# Patient Record
Sex: MALE | Race: WHITE
[De-identification: names, ages, dates, MRNs, and addresses within clinical notes are randomized per-mention and may not be internally consistent; named-entity substitution may affect disease eponyms.]

---

## 2020-06-21 ENCOUNTER — HOSPITAL ENCOUNTER (INPATIENT)
Dept: HOSPITAL 11 - JP.ED | Age: 55
LOS: 2 days | Discharge: HOME | DRG: 439 | End: 2020-06-23
Attending: INTERNAL MEDICINE | Admitting: INTERNAL MEDICINE
Payer: MEDICAID

## 2020-06-21 DIAGNOSIS — Z90.49: ICD-10-CM

## 2020-06-21 DIAGNOSIS — K86.1: ICD-10-CM

## 2020-06-21 DIAGNOSIS — F17.210: ICD-10-CM

## 2020-06-21 DIAGNOSIS — K21.9: ICD-10-CM

## 2020-06-21 DIAGNOSIS — K86.3: ICD-10-CM

## 2020-06-21 DIAGNOSIS — Z79.52: ICD-10-CM

## 2020-06-21 DIAGNOSIS — Z79.899: ICD-10-CM

## 2020-06-21 DIAGNOSIS — K52.9: ICD-10-CM

## 2020-06-21 DIAGNOSIS — K50.80: ICD-10-CM

## 2020-06-21 DIAGNOSIS — Z98.890: ICD-10-CM

## 2020-06-21 DIAGNOSIS — E87.1: ICD-10-CM

## 2020-06-21 DIAGNOSIS — K85.90: Primary | ICD-10-CM

## 2020-06-21 NOTE — EDM.PDOC
ED HPI GENERAL MEDICAL PROBLEM





- General


Chief Complaint: Abdominal Pain


Stated Complaint: ABDOMINAL PAIN


Time Seen by Provider: 06/21/20 18:45


Source of Information: Reports: Patient


History Limitations: Reports: No Limitations





- History of Present Illness


INITIAL COMMENTS - FREE TEXT/NARRATIVE: 





56 yo male presents with "pain from his pancreas". Says he was dx with tumors on

his pancreas about 2 mos ago and he is being tx'd by Dr. Heath with prednisone 

for this. He has been using OTC analgesia only so far for the pain but it is now

getting intolerable, describes a gradual worsening. He has an appt with Dr. Heath his primary tomorrow morning at 0730h. He has no appetite, and food makes

him worse. He is tolerating fluids and does not think he is dehydrated. He has 

been losing weight. He did drive himself here today. He has not been referred to

oncology for his tumors yet. He denies nausea. He has chronic diarrhea, not 

worse than usual, from his Crohn's that he also has. He has not had a fever or 

any signs/sx's of infection. 


Onset: Gradual


Duration: Week(s):, Getting Worse


Location: Reports: Abdomen


Quality: Reports: Ache, Burning


Severity: Moderate


Improves with: Reports: None


Worsens with: Reports: Other (time, eating)


Context: Reports: Other (See HPI)


Associated Symptoms: Reports: Loss of Appetite, Other (weight loss).  Denies: D

iaphoresis, Fever/Chills, Nausea/Vomiting, Shortness of Breath


Treatments PTA: Reports: Other (see below) (prednisone)


  ** ABD


Pain Score (Numeric/FACES): 10





- Related Data


                                    Allergies











Allergy/AdvReac Type Severity Reaction Status Date / Time


 


No Known Allergies Allergy   Verified 06/21/20 19:03











Home Meds: 


                                    Home Meds





Omeprazole 1 tab PO DAILY PRN 06/21/20 [History]


predniSONE [Prednisone] 1 tab PO DAILY 06/21/20 [History]











ED ROS GENERAL





- Review of Systems


Review Of Systems: See Below


Constitutional: Reports: Malaise, Decreased Appetite, Weight Loss


HEENT: Reports: No Symptoms


Respiratory: Reports: No Symptoms


Cardiovascular: Reports: No Symptoms


Endocrine: Reports: No Symptoms


GI/Abdominal: Reports: Abdominal Pain


: Reports: No Symptoms


Musculoskeletal: Reports: No Symptoms


Skin: Reports: No Symptoms


Neurological: Reports: No Symptoms


Psychiatric: Reports: No Symptoms





ED EXAM, GI/ABD





- Physical Exam


Exam: See Below


Exam Limited By: No Limitations


General Appearance: Alert, WD/WN, No Apparent Distress, Thin


Eyes: Bilateral: Normal Appearance


Ears: Normal External Exam, Normal Canal, Hearing Grossly Normal, Normal TMs


Nose: Normal Inspection, No Blood


Throat/Mouth: Normal Inspection, Normal Lips, Normal Oropharynx, Normal Voice, 

No Airway Compromise


Head: Atraumatic, Normocephalic


Neck: Normal Inspection


Respiratory/Chest: No Respiratory Distress, Lungs Clear, Normal Breath Sounds, 

No Accessory Muscle Use


Cardiovascular: Regular Rate, Rhythm, No Edema


GI/Abdominal Exam: Normal Bowel Sounds, Soft, Non-Tender, No Distention, Tender 

(epigastrium).  No: Distended, Guarding, Rigid, Rebound, Hernia


Back Exam: Normal Inspection.  No: CVA Tenderness (R), CVA Tenderness (L)


Extremities: Normal Inspection, Normal Range of Motion, Non-Tender, No Pedal 

Edema


Neurological: Alert, Oriented, CN II-XII Intact, Normal Cognition, No 

Motor/Sensory Deficits


Psychiatric: Normal Affect, Normal Mood


Skin Exam: Warm, Dry, Intact, Normal Color, No Rash





Course





- Vital Signs


Text/Narrative:: 





Dr. Santana called @ 2100h, will admit.


Last Recorded V/S: 


                                Last Vital Signs











Temp  36.3 C   06/21/20 19:07


 


Pulse  87   06/21/20 19:07


 


Resp  20   06/21/20 19:07


 


BP  160/97 H  06/21/20 19:07


 


Pulse Ox  99   06/21/20 19:07








                                        





Orthostatic Blood Pressure [     158/93


Supine]                          


Orthostatic Blood Pressure [     142/98


Standing]                        


Orthostatic Blood Pressure [     153/98


Sitting]                         











- Orders/Labs/Meds


Orders: 


                               Active Orders 24 hr











 Category Date Time Status


 


 Orthostatic Vital Signs [RC] ASDIRECTED Care  06/21/20 20:12 Active


 


 HYDROmorphone [Dilaudid] Med  06/21/20 20:56 Once





 0.5 mg IVPUSH ONETIME ONE   


 


 Sodium Chloride 0.9% [Normal Saline] 1,000 ml Med  06/21/20 21:00 Ordered





 IV ASDIRECTED   


 


 fentaNYL [Duragesic] Med  06/21/20 20:15 Active





 25 mcg TRDERM Q72H   








                                Medication Orders





Fentanyl (Duragesic)  25 mcg TRDERM Q72H [facility]


   Last Admin: 06/21/20 20:24  Dose: 25 mcg


   Documented by: VICENTE








Labs: 


                                Laboratory Tests











  06/21/20 06/21/20 06/21/20 Range/Units





  20:29 20:29 20:29 


 


WBC   15.9 H   (4.5-11.0)  K/uL


 


RBC   5.31   (4.30-5.90)  M/uL


 


Hgb   13.7  D   (12.0-15.0)  g/dL


 


Hct   40.7   (40.0-54.0)  %


 


MCV   77 L   (80-98)  fL


 


MCH   26 L   (27-31)  pg


 


MCHC   34   (32-36)  %


 


Plt Count   514 H   (150-400)  K/uL


 


Sodium    128 L  (140-148)  mmol/L


 


Potassium    3.5 L  (3.6-5.2)  mmol/L


 


Chloride    91 L  (100-108)  mmol/L


 


Carbon Dioxide    29  (21-32)  mmol/L


 


Anion Gap    11.5  (5.0-14.0)  mmol/L


 


BUN    5 L D  (7-18)  mg/dL


 


Creatinine    0.7 L  (0.8-1.3)  mg/dL


 


Est Cr Clr Drug Dosing    92.55  mL/min


 


Estimated GFR (MDRD)    > 60  (>60)  


 


Glucose    145 H  ()  mg/dL


 


Calcium    9.3  (8.5-10.1)  mg/dL


 


Lipase  499 H    ()  U/L











Meds: 


Medications











Generic Name Dose Route Start Last Admin





  Trade Name Freq  PRN Reason Stop Dose Admin


 


Fentanyl  25 mcg  06/21/20 20:15  06/21/20 20:24





  Duragesic  TRDERM   25 mcg





  Q72H [facility]   Administration














Discontinued Medications














Generic Name Dose Route Start Last Admin





  Trade Name Freq  PRN Reason Stop Dose Admin


 


Oxycodone/Acetaminophen  1 tab  06/21/20 18:53  06/21/20 19:02





  Percocet 325-7.5 Mg  PO  06/21/20 18:54  1 tab





  ONETIME STA   Administration


 


Oxycodone/Acetaminophen  1 tab  06/21/20 19:33  06/21/20 19:56





  Percocet 325-7.5 Mg  PO  06/21/20 19:34  1 tab





  ONETIME STA   Administration














- Re-Assessments/Exams


Free Text/Narrative Re-Assessment/Exam: 





06/21/20 19:34


No ill effects or benefits from the first dose of percocet 7.5/325 po


Free Text/Narrative Re-Assessment/Exam: 





06/21/20 20:13


moderate relief after 2nd dose of percocet 7.5/325 po. Will add a fentanyl patch

for sustained pain relief. Will check orthostats to support his feeling that he 

is not dehydrated.





Departure





- Departure


Time of Disposition: 21:00


Disposition: Admitted As Inpatient 64


Condition: Fair


Clinical Impression: 


 Acute on chronic pancreatitis








- Discharge Information


*PRESCRIPTION DRUG MONITORING PROGRAM REVIEWED*: No


*COPY OF PRESCRIPTION DRUG MONITORING REPORT IN PATIENT BREEZY: No


Referrals: 


Salina Heath MD [Primary Care Provider] - 


Forms:  ED Department Discharge





Sepsis Event Note (ED)





- Focused Exam


Vital Signs: 


                                   Vital Signs











  Temp Pulse Resp BP Pulse Ox


 


 06/21/20 19:07  36.3 C  87  20  160/97 H  99


 


 06/21/20 18:41  36.3 C  102 H  24 H  160/97 H  99














- My Orders


Last 24 Hours: 


My Active Orders





06/21/20 20:12


Orthostatic Vital Signs [RC] ASDIRECTED 





06/21/20 20:15


fentaNYL [Duragesic]   25 mcg TRDERM Q72H 





06/21/20 20:56


HYDROmorphone [Dilaudid]   0.5 mg IVPUSH ONETIME ONE 





06/21/20 21:00


Sodium Chloride 0.9% [Normal Saline] 1,000 ml IV ASDIRECTED 














- Assessment/Plan


Last 24 Hours: 


My Active Orders





06/21/20 20:12


Orthostatic Vital Signs [RC] ASDIRECTED 





06/21/20 20:15


fentaNYL [Duragesic]   25 mcg TRDERM Q72H 





06/21/20 20:56


HYDROmorphone [Dilaudid]   0.5 mg IVPUSH ONETIME ONE 





06/21/20 21:00


Sodium Chloride 0.9% [Normal Saline] 1,000 ml IV ASDIRECTED

## 2020-06-21 NOTE — PCM.HP.2
H&P History of Present Illness





- General


Date of Service: 06/21/20


Admit Problem/Dx: 


                           Admission Diagnosis/Problem





Admission Diagnosis/Problem      Acute pancreatitis








Source of Information: Patient, Old Records ([facility] chart including GI virtual

visit ), Provider


History Limitations: Reports: No Limitations





- History of Present Illness


Initial Comments - Free Text/Narative: 





CC: I've felt better





HPI: Xavier presents to the emergency room today with several weeks of 

progressive upper abdominal pain.  He reports a couple of months of progressive 

upper abdominal pain but it is been worse in the past few weeks and especially 

the past few days.  He describes waves of sharp pain that come and go throughout

the upper abdomen.  The pain does not radiate elsewhere.  Standing up and moving

around makes the pain worse and lying down makes it better.  Food also causes an

increase in his pain.  He often has to lay down on the couch for several hours 

after he eats.  He has been using over-the-counter pain medications with mild 

improvement.  He says that daily he takes 2 or 3 10 mg prednisone tablets and 

thinks that this helps his pain some 2.  Pain has steadily been getting worse.  

He has seen GI via a virtual visit and he declined referral to the [facility] for pseudocyst treatment because of logistics.  He reports rare nausea

but no vomiting.  No fevers or chills.  He has been losing weight because he is 

not eating very much.  Shortness of breath is at baseline and he does not have a

cough.  He has chronic diarrhea with 3-4 watery bowel movements per day and this

is unchanged.





Work-up in the emergency room revealed mild leukocytosis and hyponatremia as 

well as thrombocytosis.  Review of his laboratory work show these are similar to

his levels from 1 month ago in the clinic.  Lipase was mildly elevated at 499.  

Patient has a fair amount of pain and will be admitted for hydration, pain 

control and potentially additional work-up.


  ** ABD


Pain Score (Numeric/FACES): 10





- Related Data


Allergies/Adverse Reactions: 


                                    Allergies











Allergy/AdvReac Type Severity Reaction Status Date / Time


 


No Known Allergies Allergy   Verified 06/21/20 19:03











Home Medications: 


                                    Home Meds





Omeprazole 1 tab PO DAILY PRN 06/21/20 [History]


predniSONE [Prednisone] 1 tab PO DAILY 06/21/20 [History]











Past Medical History


Gastrointestinal History: Reports: Chronic Diarrhea, GERD, Inflammatory Bowel 

Disease (long standing Crohn's disease), Pancreatitis (with pseudocysts)





- Past Surgical History


GI Surgical History: Reports: Cholecystectomy, Colon, Colonoscopy, Hernia 

Repair/Other, Small Bowel, Other (See Below)


Other GI Surgeries/Procedures: MADHURI





Social & Family History





- Family History


Oncologic: Reports: Other (See Below) (dad had cancer)





- Tobacco Use


Smoking Status *Q: Current Every Day Smoker


Years of Tobacco use: 40


Packs/Tins Daily: 2





- Caffeine Use


Caffeine Use: Reports: Coffee





- Alcohol Use


Alcohol Use History: No





- Recreational Drug Use


Recreational Drug Use: No





H&P Review of Systems





- Review of Systems:


Review Of Systems: See Below


Free Text/Narrative: 





A complete 12 point review of systems was obtained.  Pertinent positives and 

negatives are noted in the history of present illness.  All other systems were 

reviewed and were negative except as noted.





Exam





- Exam


Exam: See Below





- Vital Signs


Vital Signs: 


                                Last Vital Signs











Temp  36.3 C   06/21/20 19:07


 


Pulse  87   06/21/20 19:07


 


Resp  20   06/21/20 19:07


 


BP  160/97 H  06/21/20 19:07


 


Pulse Ox  99   06/21/20 19:07








                                        





Orthostatic Blood Pressure [     158/93


Supine]                          


Orthostatic Blood Pressure [     142/98


Standing]                        


Orthostatic Blood Pressure [     153/98


Sitting]                         








Weight: 56.2 kg





- Exam


Quality Assessment: No: Supplemental Oxygen


General: Alert, Oriented, Cooperative.  No: Mild Distress


HEENT: Conjunctiva Clear.  No: Mucosa Moist & Pink (dry), Scleral Icterus


Neck: Supple, Trachea Midline


Lungs: Clear to Auscultation, Normal Respiratory Effort


Cardiovascular: Regular Rate, Regular Rhythm


GI/Abdominal Exam: Normal Bowel Sounds, Soft, No Distention, Tender (moderate 

diffuse but severe in upper 1/2 of abdomen )


Back Exam: Normal Inspection, Full Range of Motion


Extremities: No Pedal Edema.  No: Increased Warmth


Skin: Warm, Dry


Neuro Extensive - Mental Status: Alert, Oriented x3, Nl Response to Commands


Neuro Extensive - Motor, Sensory, Reflexes: No: Dysarthria, Abnormal Motor, 

Tremor


Psychiatric: Alert, Normal Affect





- Patient Data


Lab Results Last 24 hrs: 


                         Laboratory Results - last 24 hr











  06/21/20 06/21/20 06/21/20 Range/Units





  20:29 20:29 20:29 


 


WBC   15.9 H   (4.5-11.0)  K/uL


 


RBC   5.31   (4.30-5.90)  M/uL


 


Hgb   13.7  D   (12.0-15.0)  g/dL


 


Hct   40.7   (40.0-54.0)  %


 


MCV   77 L   (80-98)  fL


 


MCH   26 L   (27-31)  pg


 


MCHC   34   (32-36)  %


 


Plt Count   514 H   (150-400)  K/uL


 


Sodium    128 L  (140-148)  mmol/L


 


Potassium    3.5 L  (3.6-5.2)  mmol/L


 


Chloride    91 L  (100-108)  mmol/L


 


Carbon Dioxide    29  (21-32)  mmol/L


 


Anion Gap    11.5  (5.0-14.0)  mmol/L


 


BUN    5 L D  (7-18)  mg/dL


 


Creatinine    0.7 L  (0.8-1.3)  mg/dL


 


Est Cr Clr Drug Dosing    92.55  mL/min


 


Estimated GFR (MDRD)    > 60  (>60)  


 


Glucose    145 H  ()  mg/dL


 


Calcium    9.3  (8.5-10.1)  mg/dL


 


Lipase  499 H    ()  U/L











Result Diagrams: 


                                 06/21/20 20:29





                                 06/21/20 20:29





Sepsis Event Note





- Evaluation


Sepsis Screening Result: No Definite Risk





- Focused Exam


Vital Signs: 


                                   Vital Signs











  Temp Pulse Resp BP Pulse Ox


 


 06/21/20 19:07  36.3 C  87  20  160/97 H  99


 


 06/21/20 18:41  36.3 C  102 H  24 H  160/97 H  99











Date Exam was Performed: 06/21/20


Time Exam was Performed: 22:39





*Q Meaningful Use (ADM)





- VTE Risk Assess *Q


Each Risk Factor Represents 1 Point: Age 41 - 59 years, History Inflammatory 

Bowel Disease, Abnormal Pulmonary Function (COPD)


Total Score 1 Point Risk Factors: 3


Each Risk Factor Represents 2 Points: None


Total Score 2 Point Risk Factors: 0


Each Risk Factor Represents 3 Points: None


Total Score 3 Point Risk Factors: 0


Each Risk Factor Represents 5 Points: None


Total Score 5 Point Risk Factors: 0


Venous Thromboembolism Risk Factor Score *Q: 3





- Problem List


(1) Acute on chronic pancreatitis


SNOMED Code(s): 048408183


   ICD Code: K85.90 - ACUTE PANCREATITIS WITHOUT NECROSIS OR INFECTION, UNSP; 

K86.1 - OTHER CHRONIC PANCREATITIS   Status: Acute   Current Visit: Yes   





(2) Pancreatic pseudocyst


SNOMED Code(s): 530903185


   ICD Code: K86.3 - PSEUDOCYST OF PANCREAS   Status: Acute   Current Visit: Yes

  





(3) Crohn's disease


SNOMED Code(s): 38262095


   ICD Code: K50.90 - CROHN'S DISEASE, UNSPECIFIED, WITHOUT COMPLICATIONS   

Status: Acute   Current Visit: Yes   


Qualifiers: 


   Gastrointestinal tract location: small and large intestine   Digestive 

disease complication type: without complication   Qualified Code(s): K50.80 - 

Crohn's disease of both small and large intestine without complications   





(4) Tobacco dependence


SNOMED Code(s): 23216485


   ICD Code: F17.200 - NICOTINE DEPENDENCE, UNSPECIFIED, UNCOMPLICATED   Status:

Chronic   Current Visit: Yes   


Problem List Initiated/Reviewed/Updated: Yes


Orders Last 24hrs: 


                               Active Orders 24 hr











 Category Date Time Status


 


 Patient Status Manage Transfer [TRANSFER] Routine ADT  06/21/20 22:16 Ordered


 


 Orthostatic Vital Signs [RC] ASDIRECTED Care  06/21/20 20:12 Active


 


 Sodium Chloride 0.9% [Normal Saline] 1,000 ml Med  06/21/20 21:00 Active





 IV ASDIRECTED   


 


 fentaNYL [Duragesic] Med  06/21/20 20:15 Active





 25 mcg TRDERM Q72H   


 


 Resuscitation Status Routine Resus Stat  06/21/20 22:17 Ordered








                                Medication Orders





Fentanyl (Duragesic)  25 mcg TRDERM Q[facility]H [facility]


   Last Admin: 06/21/20 20:24  Dose: 25 mcg


   Documented by: VICENTE


Sodium Chloride (Normal Saline)  1,000 mls @ 200 mls/hr IV ASDIRECTED [facility]


   Last Admin: 06/21/20 21:09  Dose: 200 mls/hr


   Documented by: MARCUS








Assessment/Plan Comment:: 





ASSESSMENT AND PLAN - 





Acute on chronic pancreatitis with pseudocysts-White blood cell count is mildly 

elevated but this is stable over the last month.  Patient has had a steady 

progression in his pain that it is now unbearable.  I do not believe there is 

anything acute going on so I am going to hold off on imaging at this time.  I 

suspect he is dealing with the same chronic pancreatitis issues with a slow and 

steady increase in his pain.  Lipase is only mildly elevated.  He has not been 

eating well and does not have adequate means for pain control at home.


-IV fluids


-Solu-Medrol x1 tonight and prednisone 40 mg in the morning


-Toradol and oxycodone for moderate pain


-Dilaudid for severe pain


-Patient may be a good candidate for medical marijuana with his Crohn's disease 

as discussed below





Crohn's disease-longstanding issue with previous colon resection and partial 

small bowel resection.  Bowel habits are at baseline at this time so I do not 

think he has an acute flare of Crohn's disease at this time.  He has been 

managing his Crohn's disease with daily prednisone.


-Increased prednisone dosing as above





Tobacco dependence-smokes 2 packs/day.  No interest in quitting.


-Nicotine patch





Maintenance issues - 


- DVT prophylaxis -mechanical


- GI prophylaxis -PPI


- Nutrition -nothing by mouth


- Reinoso catheter -not indicated





CODE STATUS -full code





Admission justification -this patient will be admitted for inpatient services 

and is medically appropriate meeting medical necessity for inpatient admission 

as outlined in my documentation.  I reasonably expect the patient will require 

inpatient services that span a period time over 2 midnights. I reasonably expect

 this patient to be discharged or transferred within 96 hours after [facility]





Disposition -I would anticipate discharge home after the hospital stay





Primary care physician -Dr. Salina Santana M.D.





- Mortality Measure


Prognosis:: Good

## 2020-06-22 RX ADMIN — Medication SCH: at 09:00

## 2020-06-22 RX ADMIN — Medication SCH: at 21:57

## 2020-06-22 NOTE — PCM.PN
- General Info


Date of Service: 06/22/20


Subjective Update: 





Mr. Asif is a 55-year-old gentleman who was admitted through the emergency 

department last night with significant abdominal pain and nausea secondary to 

chronic pancreatitis and known pseudocysts.  He has been treated with narcotic 

therapy since admission and reports that his pain is under good control and 

would like to try eating.  Lipase level was mildly elevated at 500.


Functional Status: Reports: Ambulating, Urinating





- Review of Systems


General: Reports: No Symptoms


Pulmonary: Reports: No Symptoms


Cardiovascular: Reports: No Symptoms


Gastrointestinal: Reports: No Symptoms





- Patient Data


Vitals - Most Recent: 


                                Last Vital Signs











Temp  96.7 F L  06/22/20 15:00


 


Pulse  89   06/22/20 15:00


 


Resp  17   06/22/20 15:00


 


BP  119/74   06/22/20 15:00


 


Pulse Ox  99   06/22/20 15:00








                                        





Orthostatic Blood Pressure [     158/93


Supine]                          


Orthostatic Blood Pressure [     142/98


Standing]                        


Orthostatic Blood Pressure [     153/98


Sitting]                         








Weight - Most Recent: 121 lb 15.99 oz


I&O - Last 24 Hours: 


                                 Intake & Output











 06/22/20 06/22/20 06/22/20





 06:59 14:59 22:59


 


Intake Total 862 892 


 


Output Total 1200 200 200


 


Balance -338 692 -200











Lab Results Last 24 Hours: 


                         Laboratory Results - last 24 hr











  06/21/20 06/21/20 06/21/20 Range/Units





  20:29 20:29 20:29 


 


WBC   15.9 H   (4.5-11.0)  K/uL


 


RBC   5.31   (4.30-5.90)  M/uL


 


Hgb   13.7  D   (12.0-15.0)  g/dL


 


Hct   40.7   (40.0-54.0)  %


 


MCV   77 L   (80-98)  fL


 


MCH   26 L   (27-31)  pg


 


MCHC   34   (32-36)  %


 


Plt Count   514 H   (150-400)  K/uL


 


Sodium    128 L  (140-148)  mmol/L


 


Potassium    3.5 L  (3.6-5.2)  mmol/L


 


Chloride    91 L  (100-108)  mmol/L


 


Carbon Dioxide    29  (21-32)  mmol/L


 


Anion Gap    11.5  (5.0-14.0)  mmol/L


 


BUN    5 L D  (7-18)  mg/dL


 


Creatinine    0.7 L  (0.8-1.3)  mg/dL


 


Est Cr Clr Drug Dosing    92.55  mL/min


 


Estimated GFR (MDRD)    > 60  (>60)  


 


Glucose    145 H  ()  mg/dL


 


Calcium    9.3  (8.5-10.1)  mg/dL


 


Magnesium     (1.8-2.4)  mg/dL


 


C-Reactive Protein     (0.0-0.3)  mg/dL


 


Lipase  499 H    ()  U/L














  06/22/20 06/22/20 Range/Units





  04:50 04:50 


 


WBC  13.4 H   (4.5-11.0)  K/uL


 


RBC  5.08   (4.30-5.90)  M/uL


 


Hgb  13.1   (12.0-15.0)  g/dL


 


Hct  39.7 L   (40.0-54.0)  %


 


MCV  78 L   (80-98)  fL


 


MCH  26 L   (27-31)  pg


 


MCHC  33   (32-36)  %


 


Plt Count  470 H   (150-400)  K/uL


 


Sodium   133 L  (140-148)  mmol/L


 


Potassium   4.3  (3.6-5.2)  mmol/L


 


Chloride   97 L  (100-108)  mmol/L


 


Carbon Dioxide   30  (21-32)  mmol/L


 


Anion Gap   10.3  (5.0-14.0)  mmol/L


 


BUN   4 L  (7-18)  mg/dL


 


Creatinine   0.7 L  (0.8-1.3)  mg/dL


 


Est Cr Clr Drug Dosing   91.13  mL/min


 


Estimated GFR (MDRD)   > 60  (>60)  


 


Glucose   139 H  ()  mg/dL


 


Calcium   8.8  (8.5-10.1)  mg/dL


 


Magnesium   1.9  (1.8-2.4)  mg/dL


 


C-Reactive Protein   4.78 H  (0.0-0.3)  mg/dL


 


Lipase    ()  U/L











Med Orders - Current: 


                               Current Medications





Acetaminophen (Tylenol)  650 mg PO Q4H PRN


   PRN Reason: Pain (Mild 1-3)/fever


Albuterol (Proventil Neb Soln)  2.5 mg NEB Q4H PRN


   PRN Reason: Shortness Of Breath/wheezing


Fentanyl (Duragesic)  25 mcg TRDERM Q72H [facility]


   Last Admin: 06/21/20 20:24 Dose:  25 mcg


   Documented by: 


Ketorolac Tromethamine (Toradol)  30 mg IVPUSH Q6H PRN


   PRN Reason: Pain (moderate 4-6)


   Stop: 06/26/20 22:50


Lorazepam (Ativan)  0.5 mg IVPUSH Q4H PRN


   PRN Reason: Nausea/Vomiting


Nicotine (Habitrol)  21 mg TRDERM DAILY [facility]


   Last Admin: 06/22/20 08:59 Dose:  21 mg


   Documented by: 


Nicotine (Habitrol)  14 mg TRDERM DAILY [facility]


   Last Admin: 06/22/20 15:40 Dose:  14 mg


   Documented by: 


Nicotine Polacrilex (Nicorelief)  4 mg CHEW Q1H PRN


   PRN Reason: SMOKING CESSATION


Verify Fentanyl (Patch)  0 each TOP BID [facility]


   Last Admin: 06/22/20 09:00 Dose:  Not Given


   Documented by: 


Ondansetron HCl (Zofran)  4 mg IV Q6H PRN


   PRN Reason: Nausea/Vomiting


Ondansetron HCl (Zofran Odt)  4 mg PO Q6H PRN


   PRN Reason: Nausea able to take PO


Oxycodone HCl (Oxycodone)  5 - 10 mg PO Q4H PRN


   PRN Reason: Pain


   Last Admin: 06/22/20 13:17 Dose:  10 mg


   Documented by: 


Pantoprazole Sodium (Protonix***)  40 mg PO ACBREAKFAST [facility]


   Last Admin: 06/22/20 07:31 Dose:  40 mg


   Documented by: 


Prednisone (Prednisone)  40 mg PO WITHBREAKFAST [facility]


   Last Admin: 06/22/20 08:59 Dose:  40 mg


   Documented by: 





Discontinued Medications





Hydromorphone HCl (Dilaudid)  0.5 mg IVPUSH ONETIME ONE


   Stop: 06/21/20 20:57


   Last Admin: 06/21/20 21:10 Dose:  0.5 mg


   Documented by: 


Hydromorphone HCl (Dilaudid)  1 mg IVPUSH Q2H PRN


   PRN Reason: Pain (severe 7-10)


   Last Admin: 06/21/20 23:24 Dose:  1 mg


   Documented by: 


Sodium Chloride (Normal Saline)  1,000 mls @ 200 mls/hr IV ASDIRECTED [facility]


   Last Admin: 06/21/20 21:09 Dose:  200 mls/hr


   Documented by: 


Sodium Chloride (Normal Saline)  1,000 mls @ 125 mls/hr IV ASDIRECTED [facility]


   Last Admin: 06/22/20 12:06 Dose:  125 mls/hr


   Documented by: 


Methylprednisolone Sodium Succinate (Solu-Medrol)  125 mg IVPUSH ONETIME ONE


   Stop: 06/21/20 22:50


   Last Admin: 06/21/20 23:04 Dose:  125 mg


   Documented by: 


Nicotine (Habitrol)  21 mg TRDERM ONETIME ONE


   Stop: 06/21/20 22:50


   Last Admin: 06/21/20 23:04 Dose:  21 mg


   Documented by: 


Oxycodone/Acetaminophen (Percocet 325-7.5 Mg)  1 tab PO ONETIME STA


   Stop: 06/21/20 18:54


   Last Admin: 06/21/20 19:02 Dose:  1 tab


   Documented by: 


Oxycodone/Acetaminophen (Percocet 325-7.5 Mg)  1 tab PO ONETIME STA


   Stop: 06/21/20 19:34


   Last Admin: 06/21/20 19:56 Dose:  1 tab


   Documented by: 











- Exam


Quality Assessment: DVT Prophylaxis


General: Alert, Oriented, Cooperative, No Acute Distress


Lungs: Clear to Auscultation, Normal Respiratory Effort


Cardiovascular: Regular Rate, Regular Rhythm, No Murmurs


GI/Abdominal Exam: Soft, Non-Tender, No Organomegaly, No Distention


Back Exam: Normal Inspection, Full Range of Motion


Extremities: Non-Tender, No Pedal Edema





Sepsis Event Note





- Evaluation


Sepsis Screening Result: No Definite Risk





- Focused Exam


Vital Signs: 


                                   Vital Signs











  Temp Pulse Resp BP Pulse Ox


 


 06/22/20 15:00  96.7 F L  89  17  119/74  99


 


 06/22/20 14:35      98


 


 06/22/20 11:53  96.1 F L  68  14  131/81  98


 


 06/22/20 07:23      97


 


 06/22/20 07:16  96.9 F  62  12  138/74  98


 


 06/22/20 07:11      98











Date Exam was Performed: 06/22/20


Time Exam was Performed: 16:37





- Problem List Review


Problem List Initiated/Reviewed/Updated: Yes





- My Orders


Last 24 Hours: 


My Active Orders





06/22/20 Lunch


Regular Diet [DIET] 





06/22/20 12:54


Convert IV to Saline Lock [OM.PC] Routine 





06/22/20 15:30


Nicotine [Habitrol]   14 mg TRDERM DAILY 





06/22/20 15:31


Nicotine Polacrilex [Nicorelief]   4 mg CHEW Q1H PRN 





06/23/20 05:00


CBC WITH AUTO DIFF [HEME] Timed 


COMPREHENSIVE METABOLIC PN,CMP [CHEM] Timed 


LIPASE [CHEM] Timed 














- Plan


Plan:: 





ASSESSMENT AND PLAN - 





Abdominal pain secondary to chronic pancreatitis with pseudocysts-pain under 

better control on current narcotic therapy


-Saline lock IV


-prednisone 40 mg 


-Toradol and oxycodone for pain


-Discussed with his primary care provider Dr. Heath, she does not feel that 

ongoing narcotic use is a good option for management of his current symptoms as 

an outpatient





Crohn's disease-longstanding issue with previous colon resection and partial 

small bowel resection.  Bowel habits are at baseline at this time so I do not 

think he has an acute flare of Crohn's disease at this time.  He has been 

managing his Crohn's disease with daily prednisone.


-Increased prednisone dosing as above





Tobacco dependence-smokes 2 packs/day.  No interest in quitting.


-Nicotine patch





Maintenance issues - 


- DVT prophylaxis -mechanical


- GI prophylaxis -PPI


- Nutrition -nothing by mouth


- Reinoso catheter -not indicated





CODE STATUS -full code





Admission justification -this patient will be admitted for inpatient services 

and is medically appropriate meeting medical necessity for inpatient admission 

as outlined in my documentation.  I reasonably expect the patient will require 

inpatient services that span a period time over 2 midnights. I reasonably expect

 this patient to be discharged or transferred within 96 hours [facility]





Disposition -I would anticipate discharge home after the hospital stay





Primary care physician -Dr. Salina Heath

## 2020-06-23 NOTE — PCM.DCSUM1
**Discharge Summary





- Hospital Course


Brief History: Mr. Asif is a 55-year-old gentleman who was admitted through the 

emergency department for management of pain secondary to acute on chronic 

pancreatitis with a large pancreatic pseudocysts.





- Discharge Data


Discharge Date: 06/23/20


Discharge Disposition: Home, Self-Care 01


Condition: Fair





- Referral to [facility]


Primary Care Physician: 


Salina Heath MD








- Discharge Diagnosis/Problem(s)


(1) Acute on chronic pancreatitis


SNOMED Code(s): 755442647


   ICD Code: K85.90 - ACUTE PANCREATITIS WITHOUT NECROSIS OR INFECTION, UNSP; 

K86.1 - OTHER CHRONIC PANCREATITIS   Status: Acute   Current Visit: Yes   





(2) Pancreatic pseudocyst


SNOMED Code(s): 268804971


   ICD Code: K86.3 - PSEUDOCYST OF PANCREAS   Status: Chronic   Current Visit: 

Yes   





(3) Crohn's disease


SNOMED Code(s): 82951581


   ICD Code: K50.90 - CROHN'S DISEASE, UNSPECIFIED, WITHOUT COMPLICATIONS   

Status: Chronic   Current Visit: Yes   


Qualifiers: 


   Gastrointestinal tract location: small and large intestine   Digestive 

disease complication type: without complication   Qualified Code(s): K50.80 - 

Crohn's disease of both small and large intestine without complications   





(4) Tobacco dependence


SNOMED Code(s): 06146978


   ICD Code: F17.200 - NICOTINE DEPENDENCE, UNSPECIFIED, UNCOMPLICATED   Status:

Chronic   Current Visit: Yes   





- Patient Summary/Data


Hospital Course: 





Mr. Asif presented to the emergency room with several weeks of progressive upper

abdominal pain.  He reports a couple of months of progressive upper abdominal 

pain but it is been worse in the past few weeks and especially the past few 

days.  He describes waves of sharp pain that come and go throughout the upper 

abdomen.  The pain does not radiate elsewhere.  Standing up and moving around 

makes the pain worse and lying down makes it better.  Food also causes an 

increase in his pain.  He often has to lay down on the couch for several hours 

after he eats.  He has been using over-the-counter pain medications with mild 

improvement.  He says that daily he takes 2 or 3 10 mg prednisone tablets and 

thinks that this helps his pain as well.  Pain has steadily been getting worse. 

He has seen GI via a virtual visit and he declined referral to the [facility] for pseudocyst treatment because of logistics.  He reports rare nausea

but no vomiting.  No fevers or chills.  He has been losing weight because he is 

not eating very much.  Shortness of breath is at baseline and he does not have a

cough.  He has chronic diarrhea with 3-4 watery bowel movements per day and this

is unchanged. Work-up in the emergency room revealed mild leukocytosis and 

hyponatremia as well as thrombocytosis.  Review of his laboratory work show 

these are similar to his levels from 1 month ago in the clinic.  Lipase was 

mildly elevated at 499.  Patient has a fair amount of pain and will be admitted 

for hydration, pain control and potentially additional work-up.  On admission he

was given IV fluids for hydration and kept n.p.o.  He was feeling somewhat 

better by the following morning and was started on a regular diet which he 

tolerated up until the time of discharge.  He is frustrated by the fact that he 

is unable to get his pseudocyst managed locally.  He refuses going to the 

[facility] because of the distance.  Initially he would not accept 

referral to [place], but will now accept this as an alternative to the  of .  

He was started on narcotics on admission and these were discontinued and will 

not be continued after discharge.  He has been taking prednisone for management 

of his symptoms in a higher dose than prescribed.  I explained to him that this 

is dangerous and he should only take the prescribed dose.  He will be discharged

home on 20 mg daily for 4 days and then 10 mg daily thereafter.  Activity will 

be as tolerated and he will resume his usual diet.  Follow-up appointment has 

been scheduled with his primary care provider Dr. Heath within 1 week.  

Appointment with gastroenterology at [facility] in [facility] has 

been requested, they will review his information and then contact him directly 

about an appointment.





- Patient Instructions


Diet: Usual Diet as Tolerated


Activity: As Tolerated





- Discharge Plan


*PRESCRIPTION DRUG MONITORING PROGRAM REVIEWED*: Not Applicable


*COPY OF PRESCRIPTION DRUG MONITORING REPORT IN PATIENT BREEZY: Not Applicable


Prescriptions/Med Rec: 


predniSONE [Prednisone] 1 tab PO DAILY #30


Home Medications: 


                                    Home Meds





Omeprazole 1 tab PO DAILY PRN 06/21/20 [History]


predniSONE [Prednisone] 1 tab PO DAILY #30 06/23/20 [Rx]








Referrals: 


Salina Heath MD [Primary Care Provider] - 06/29/20 1:40 pm


(Please arrive 15 minutes early to register for your appointment.


Your appointment with Dr. Heath will be at the [facility].)





- Discharge Summary/Plan Comment


DC Time >30 min.: No





- Patient Data


Vitals - Most Recent: 


                                Last Vital Signs











Temp  97.3 F   06/23/20 10:36


 


Pulse  92   06/23/20 10:36


 


Resp  16   06/23/20 10:36


 


BP  118/77   06/23/20 10:36


 


Pulse Ox  98   06/23/20 10:36








                                        





Orthostatic Blood Pressure [     158/93


Supine]                          


Orthostatic Blood Pressure [     142/98


Standing]                        


Orthostatic Blood Pressure [     153/98


Sitting]                         








Weight - Most Recent: 121 lb 15.99 oz


I&O - Last 24 hours: 


                                 Intake & Output











 06/22/20 06/23/20 06/23/20





 22:59 06:59 14:59


 


Intake Total 1000  580


 


Output Total 200  


 


Balance 800  580











Lab Results - Last 24 hrs: 


                         Laboratory Results - last 24 hr











  06/23/20 06/23/20 Range/Units





  05:00 05:00 


 


WBC  10.7   (4.5-11.0)  K/uL


 


RBC  4.77   (4.30-5.90)  M/uL


 


Hgb  12.5   (12.0-15.0)  g/dL


 


Hct  37.4 L   (40.0-54.0)  %


 


MCV  78 L   (80-98)  fL


 


MCH  26 L   (27-31)  pg


 


MCHC  33   (32-36)  %


 


Plt Count  486 H   (150-400)  K/uL


 


Neut % (Auto)  75 H   (36-66)  %


 


Lymph % (Auto)  16 L   (24-44)  %


 


Mono % (Auto)  8 H   (2-6)  %


 


Eos % (Auto)  0 L   (2-4)  %


 


Baso % (Auto)  0   (0-1)  %


 


Sodium   132 L  (140-148)  mmol/L


 


Potassium   3.6  (3.6-5.2)  mmol/L


 


Chloride   97 L  (100-108)  mmol/L


 


Carbon Dioxide   30  (21-32)  mmol/L


 


Anion Gap   8.6  (5.0-14.0)  mmol/L


 


BUN   5 L  (7-18)  mg/dL


 


Creatinine   0.7 L  (0.8-1.3)  mg/dL


 


Est Cr Clr Drug Dosing   91.13  mL/min


 


Estimated GFR (MDRD)   > 60  (>60)  


 


Glucose   98  ()  mg/dL


 


Calcium   8.6  (8.5-10.1)  mg/dL


 


Total Bilirubin   0.2  (0.2-1.0)  mg/dL


 


AST   11 L D  (15-37)  U/L


 


ALT   14  (12-78)  U/L


 


Alkaline Phosphatase   90  ()  U/L


 


Total Protein   6.2 L  (6.4-8.2)  g/dL


 


Albumin   2.5 L  (3.4-5.0)  g/dL


 


Globulin   3.7 H  (2.3-3.5)  g/dL


 


Albumin/Globulin Ratio   0.7 L  (1.2-2.2)  


 


Lipase   210  ()  U/L











Med Orders - Current: 


                               Current Medications





Acetaminophen (Tylenol)  650 mg PO Q4H PRN


   PRN Reason: Pain (Mild 1-3)/fever


Albuterol (Proventil Neb Soln)  2.5 mg NEB Q4H PRN


   PRN Reason: Shortness Of Breath/wheezing


Ketorolac Tromethamine (Toradol)  30 mg IVPUSH Q6H PRN


   PRN Reason: Pain (moderate 4-6)


   Stop: 06/26/20 22:50


   Last Admin: 06/23/20 03:13 Dose:  30 mg


   Documented by: 


Lorazepam (Ativan)  0.5 mg IVPUSH Q4H PRN


   PRN Reason: Nausea/Vomiting


Nicotine (Habitrol)  21 mg TRDERM DAILY [facility]


   Last Admin: 06/23/20 11:07 Dose:  21 mg


   Documented by: 


Nicotine (Habitrol)  14 mg TRDERM DAILY [facility]


   Last Admin: 06/23/20 11:06 Dose:  14 mg


   Documented by: 


Nicotine Polacrilex (Nicorelief)  4 mg CHEW Q1H PRN


   PRN Reason: SMOKING CESSATION


Ondansetron HCl (Zofran)  4 mg IV Q6H PRN


   PRN Reason: Nausea/Vomiting


Ondansetron HCl (Zofran Odt)  4 mg PO Q6H PRN


   PRN Reason: Nausea able to take PO


Oxycodone HCl (Oxycodone)  5 mg PO Q4H PRN


   PRN Reason: Pain


   Last Admin: 06/23/20 10:52 Dose:  5 mg


   Documented by: 


Pantoprazole Sodium (Protonix***)  40 mg PO ACBREAKFAST [facility]


   Last Admin: 06/23/20 07:08 Dose:  40 mg


   Documented by: 


Prednisone (Prednisone)  40 mg PO WITHBREAKFAST [facility]


   Last Admin: 06/23/20 07:08 Dose:  40 mg


   Documented by: 





Discontinued Medications





Diphenoxylate HCl/Atropine (Lomotil 0.025-2.5 Mg)  1 tab PO ONETIME ONE


   Stop: 06/23/20 13:01


   Last Admin: 06/23/20 13:07 Dose:  1 tab


   Documented by: 


Fentanyl (Duragesic)  25 mcg TRDERM Q72H [facility]


   Last Admin: 06/21/20 20:24 Dose:  25 mcg


   Documented by: 


Hydromorphone HCl (Dilaudid)  0.5 mg IVPUSH ONETIME ONE


   Stop: 06/21/20 20:57


   Last Admin: 06/21/20 21:10 Dose:  0.5 mg


   Documented by: 


Hydromorphone HCl (Dilaudid)  1 mg IVPUSH Q2H PRN


   PRN Reason: Pain (severe 7-10)


   Last Admin: 06/21/20 23:24 Dose:  1 mg


   Documented by: 


Sodium Chloride (Normal Saline)  1,000 mls @ 200 mls/hr IV ASDIRECTED [facility]


   Last Admin: 06/21/20 21:09 Dose:  200 mls/hr


   Documented by: 


Sodium Chloride (Normal Saline)  1,000 mls @ 125 mls/hr IV ASDIRECTED [facility]


   Last Admin: 06/22/20 12:06 Dose:  125 mls/hr


   Documented by: 


Methylprednisolone Sodium Succinate (Solu-Medrol)  125 mg IVPUSH ONETIME ONE


   Stop: 06/21/20 22:50


   Last Admin: 06/21/20 23:04 Dose:  125 mg


   Documented by: 


Nicotine (Habitrol)  21 mg TRDERM ONETIME ONE


   Stop: 06/21/20 22:50


   Last Admin: 06/21/20 23:04 Dose:  21 mg


   Documented by: 


Verify Fentanyl (Patch)  0 each TOP BID [facility]


   Last Admin: 06/22/20 21:57 Dose:  Not Given


   Documented by: 


Oxycodone HCl (Oxycodone)  5 - 10 mg PO Q4H PRN


   PRN Reason: Pain


   Last Admin: 06/22/20 13:17 Dose:  10 mg


   Documented by: 


Oxycodone/Acetaminophen (Percocet 325-7.5 Mg)  1 tab PO ONETIME STA


   Stop: 06/21/20 18:54


   Last Admin: 06/21/20 19:02 Dose:  1 tab


   Documented by: 


Oxycodone/Acetaminophen (Percocet 325-7.5 Mg)  1 tab PO ONETIME STA


   Stop: 06/21/20 19:34


   Last Admin: 06/21/20 19:56 Dose:  1 tab


   Documented by: 











- Exam


Quality Assessment: Reports: DVT Prophylaxis


General: Reports: Alert, Oriented, Cooperative, Mild Distress


Lungs: Reports: Clear to Auscultation, Normal Respiratory Effort


Cardiovascular: Reports: Regular Rate, Regular Rhythm, No Murmurs


GI/Abdominal Exam: Soft, Non-Tender, No Organomegaly, No Distention


Extremities: Non-Tender, No Pedal Edema

## 2020-09-15 ENCOUNTER — HOSPITAL ENCOUNTER (EMERGENCY)
Dept: HOSPITAL 11 - JP.ED | Age: 55
Discharge: NURSING, REHAB OR LONG-TERM CARE | End: 2020-09-15
Payer: MEDICAID

## 2020-09-15 DIAGNOSIS — F17.210: ICD-10-CM

## 2020-09-15 DIAGNOSIS — Z79.899: ICD-10-CM

## 2020-09-15 DIAGNOSIS — K85.90: Primary | ICD-10-CM

## 2020-09-15 DIAGNOSIS — Z90.49: ICD-10-CM

## 2020-09-15 DIAGNOSIS — K50.90: ICD-10-CM

## 2020-09-15 DIAGNOSIS — K21.9: ICD-10-CM

## 2020-09-15 PROCEDURE — 85025 COMPLETE CBC W/AUTO DIFF WBC: CPT

## 2020-09-15 PROCEDURE — 96375 TX/PRO/DX INJ NEW DRUG ADDON: CPT

## 2020-09-15 PROCEDURE — 36415 COLL VENOUS BLD VENIPUNCTURE: CPT

## 2020-09-15 PROCEDURE — 74177 CT ABD & PELVIS W/CONTRAST: CPT

## 2020-09-15 PROCEDURE — 80053 COMPREHEN METABOLIC PANEL: CPT

## 2020-09-15 PROCEDURE — 96374 THER/PROPH/DIAG INJ IV PUSH: CPT

## 2020-09-15 PROCEDURE — 99285 EMERGENCY DEPT VISIT HI MDM: CPT

## 2020-09-15 PROCEDURE — 96361 HYDRATE IV INFUSION ADD-ON: CPT

## 2020-09-15 PROCEDURE — 83690 ASSAY OF LIPASE: CPT

## 2020-09-15 NOTE — CT
Abdomen Pelvis w Cont

 

CLINICAL HISTORY: History of pancreatitis, abdominal pain

 

COMPARISON: May 2020. 

 

TECHNIQUE: Transverse scans were obtained from the base of the lungs to the

pubic symphysis following oral contrast and IV infusion of contrast.Auto dosage

reduction and iterative reconstructiontechniques employed.

 

FINDINGS: The lung bases show some minimal scarring in the right middle lobe.

The liver shows diffuse patchy fatty infiltration. There there are 2 small left

lobe liver cysts. There is mild prominence of the hepatic bile ducts. Previously

seen large cysts involving the head of the pancreas have involuted. There is

persistent low attenuation in pancreatic head enlargement the including a large

number of coarse calcifications which are seen throughout the pancreas. There is

pancreatic ductal dilatation throughout the body. The low attenuation and fluid

attenuation foci extending down to the mesentery on prior CT has involuted

significantly. There is currently 1.5 x 2.2 cm fluid density collection in the

mid mesentery. The gallbladder has been removed. The spleen has a normal size

and shape.

 The adrenal glands are normal bilaterally . The kidneys show no mass, stones or

hydronephrosis. The ureters have normal course and contour. The bladder has a

normal contour. Small intestinal configuration is nonacute there are surgical

sutures and clips in the right the colon there is some generalized thickening of

the sigmoid with some suggestion of the mucosal enhancement.. This was seen on

the prior study there are lesser degree.

There is no suspicious retroperitoneal adenopathy. 

 

 

IMPRESSION: Diffuse changes of chronic pancreatitis

 

2 large fluid collections in the pancreatic head have involuted since prior

study

 

Mesenteric inflammation and fluid collections have involuted significantly since

prior study

 

Changes of pancreatitis diminished since prior study

 

Diffuse fatty infiltration of the liver increased since prior study

## 2020-09-15 NOTE — EDM.PDOC
<Siri Mccullough M - Last Filed: 09/15/20 15:07>





ED HPI GENERAL MEDICAL PROBLEM





- General


Chief Complaint: Abdominal Pain


Stated Complaint: MEDICAL VIA NORTH


Time Seen by Provider: 09/15/20 13:40


Source of Information: Reports: Patient, RN, RN Notes Reviewed


History Limitations: Reports: No Limitations





- History of Present Illness


INITIAL COMMENTS - FREE TEXT/NARRATIVE: 





Pt here to ER with c/o pain similar to pancreatitis flare a few months ago. Pt 

pain is 6/10 to abd. Pain does not radiate. Pain is not relieved with OTC. 

Nausea with dry heaves. C/o pain to abd. Denies CP, cough, chills, SOB, or 

fever. Positive for sweats lat night. 


Onset: Today


Onset Date: 09/15/20


Onset Time: 03:00


Duration: Hour(s):, Getting Worse


Location: Reports: Abdomen


Severity: Moderate


Improves with: Reports: None


Associated Symptoms: Reports: Loss of Appetite, Nausea/Vomiting


  ** Upper Abdominal


Pain Score (Numeric/FACES): 7





- Related Data


                                    Allergies











Allergy/AdvReac Type Severity Reaction Status Date / Time


 


No Known Allergies Allergy   Verified 09/15/20 13:15











Home Meds: 


                                    Home Meds





Omeprazole 1 tab PO DAILY PRN 06/21/20 [History]


predniSONE [Prednisone] 1 tab PO DAILY #30 06/23/20 [Rx]











Past Medical History


Gastrointestinal History: Reports: Chronic Diarrhea, GERD, Inflammatory Bowel 

Disease, Pancreatitis


Musculoskeletal History: Reports: Other (See Below)


Other Musculoskeletal History: crush injury r knee  3 to 4 years ago.





- Infectious Disease History


Infectious Disease History: Reports: Chicken Pox





- Past Surgical History


GI Surgical History: Reports: Cholecystectomy, Colon, Colonoscopy, Hernia 

Repair/Other, Small Bowel, Other (See Below)


Other GI Surgeries/Procedures: MADHURI





Social & Family History





- Family History


Family Medical History: Noncontributory


Oncologic: Reports: Other (See Below)





- Tobacco Use


Smoking Status *Q: Current Every Day Smoker


Years of Tobacco use: 40


Packs/Tins Daily: 2


Used Tobacco, but Quit: No


Second Hand Smoke Exposure: No





- Caffeine Use


Caffeine Use: Reports: Coffee





- Alcohol Use


Days Per Week of Alcohol Use: 7


Number of Drinks Per Day: 2


Total Drinks Per Week: 14





- Recreational Drug Use


Recreational Drug Use: Yes


Recreational Drug Type: Reports: Marijuana/Hashish


Recreational Drug Use Frequency: Daily





ED ROS GENERAL





- Review of Systems


Review Of Systems: See Below


Constitutional: Reports: Night Sweats, Decreased Appetite


HEENT: Reports: No Symptoms


Respiratory: Reports: No Symptoms


Cardiovascular: Reports: No Symptoms


GI/Abdominal: Reports: Abdominal Pain, Decreased Appetite


: Reports: No Symptoms


Musculoskeletal: Reports: No Symptoms


Skin: Reports: No Symptoms


Neurological: Reports: No Symptoms


Psychiatric: Reports: No Symptoms


Hematologic/Lymphatic: Reports: No Symptoms


Immunologic: Reports: No Symptoms





ED EXAM, GI/ABD





- Physical Exam


Exam: See Below


Exam Limited By: No Limitations


General Appearance: Alert, WD/WN, Moderate Distress


Head: Normocephalic


Neck: Normal Inspection


Respiratory/Chest: No Respiratory Distress, Lungs Clear, Normal Breath Sounds, 

No Accessory Muscle Use


Cardiovascular: Normal Peripheral Pulses, Regular Rate, Rhythm, No Edema, No 

Gallop, No JVD, No Murmur, No Rub


GI/Abdominal Exam: Normal Bowel Sounds, Guarding, Tender


 (Male) Exam: Deferred


Rectal (Males) Exam: Deferred


Back Exam: Normal Inspection


Extremities: Normal Inspection


Neurological: Alert, Oriented, CN II-XII Intact, Normal Cognition


Psychiatric: Normal Affect


Skin Exam: Warm, Dry





Course





- Vital Signs


Text/Narrative:: 





Exam


Labs ordered


Labs appear WNL


Pain med no relief


Will give larger dose


Waiting for CT 





Departure





- Departure


Disposition: DC/[facility]fer to [facility]


Clinical Impression: 


 Acute on chronic pancreatitis





Crohn's disease


Qualifiers:


 Gastrointestinal tract location: small and large intestine Digestive disease 

complication type: without complication Qualified Code(s): K50.80 - Crohn's 

disease of both small and large intestine without complications








- Discharge Information


Referrals: 


PCP,None [Primary Care Provider] - 


Forms:  ED Department Discharge





Sepsis Event Note (ED)





- Evaluation


Sepsis Screening Result: No Definite Risk





<StellarKapil - Last Filed: 09/15/20 16:39>





ED ROS GENERAL





- Review of Systems


Review Of Systems: See Below (Agree with below)





ED EXAM, GI/ABD





- Physical Exam


Exam: See Below (Agree with below)





Course





- Vital Signs


Last Recorded V/S: 





                                Last Vital Signs











Temp  97.3 F   09/15/20 13:14


 


Pulse  80   09/15/20 15:00


 


Resp  16   09/15/20 13:14


 


BP  132/90   09/15/20 15:00


 


Pulse Ox  99   09/15/20 13:14














- Orders/Labs/Meds


Orders: 





                               Active Orders 24 hr











 Category Date Time Status


 


 Peripheral IV Care [RC] .AS DIRECTED Care  09/15/20 13:45 Active


 


 Iopamidol [Isovue-300 (61%)] Med  09/15/20 14:15 Active





 100 ml IV .AS DIRECTED   


 


 Sodium Chloride 0.9% [Normal Saline] 100 ml Med  09/15/20 14:15 Active





 IV ASDIRECTED   


 


 Sodium Chloride 0.9% [Saline Flush] Med  09/15/20 13:45 Active





 10 ml FLUSH ASDIRECTED PRN   


 


 Peripheral IV Insertion Adult [OM.PC] Routine Oth  09/15/20 13:45 Ordered








                                Medication Orders





Sodium Chloride (Normal Saline)  100 mls @ 3 mls/sec IV ASDIRECTED DAPHNE


   Last Admin: 09/15/20 15:02  Dose: 3 mls/sec


   Documented by: BLANK


Iopamidol (Isovue-300 (61%))  100 ml IV .AS DIRECTED DAPHNE


   Last Admin: 09/15/20 15:02  Dose: 100 ml


   Documented by: BLANK


Sodium Chloride (Saline Flush)  10 ml FLUSH ASDIRECTED PRN


   PRN Reason: Keep Vein Open








Labs: 





                                Laboratory Tests











  09/15/20 09/15/20 Range/Units





  13:46 13:46 


 


WBC  12.0 H   (4.5-11.0)  K/uL


 


RBC  5.97 H   (4.30-5.90)  M/uL


 


Hgb  16.2 H D   (12.0-15.0)  g/dL


 


Hct  48.4   (40.0-54.0)  %


 


MCV  81   (80-98)  fL


 


MCH  27   (27-31)  pg


 


MCHC  34   (32-36)  %


 


Plt Count  283   (150-400)  K/uL


 


Neut % (Auto)  88 H   (36-66)  %


 


Lymph % (Auto)  5 L   (24-44)  %


 


Mono % (Auto)  6   (2-6)  %


 


Eos % (Auto)  0 L   (2-4)  %


 


Baso % (Auto)  0   (0-1)  %


 


Sodium   135 L  (140-148)  mmol/L


 


Potassium   4.1  (3.6-5.2)  mmol/L


 


Chloride   98 L  (100-108)  mmol/L


 


Carbon Dioxide   27  (21-32)  mmol/L


 


Anion Gap   14.1 H  (5.0-14.0)  mmol/L


 


BUN   5 L  (7-18)  mg/dL


 


Creatinine   1.1  D  (0.8-1.3)  mg/dL


 


Est Cr Clr Drug Dosing   74.09  mL/min


 


Estimated GFR (MDRD)   > 60  (>60)  


 


Glucose   121 H  ()  mg/dL


 


Calcium   8.5  (8.5-10.1)  mg/dL


 


Total Bilirubin   0.3  (0.2-1.0)  mg/dL


 


AST   32  D  (15-37)  U/L


 


ALT   14  (12-78)  U/L


 


Alkaline Phosphatase   155 H  ()  U/L


 


Total Protein   6.5  (6.4-8.2)  g/dL


 


Albumin   2.9 L  (3.4-5.0)  g/dL


 


Globulin   3.6 H  (2.3-3.5)  g/dL


 


Albumin/Globulin Ratio   0.8 L  (1.2-2.2)  


 


Lipase   331  ()  U/L











Meds: 





Medications











Generic Name Dose Route Start Last Admin





  Trade Name Frelashell  PRN Reason Stop Dose Admin


 


Sodium Chloride  100 mls @ 3 mls/sec  09/15/20 14:15  09/15/20 15:02





  Normal Saline  IV   3 mls/sec





  ASDIRECTED DAPHNE   Administration


 


Iopamidol  100 ml  09/15/20 14:15  09/15/20 15:02





  Isovue-300 (61%)  IV   100 ml





  .AS DIRECTED DAPHNE   Administration


 


Sodium Chloride  10 ml  09/15/20 13:45 





  Saline Flush  FLUSH  





  ASDIRECTED PRN  





  Keep Vein Open  














Discontinued Medications














Generic Name Dose Route Start Last Admin





  Trade Name Frelashell  PRN Reason Stop Dose Admin


 


Fentanyl  25 mcg  09/15/20 13:47  09/15/20 14:35





  Sublimaze  IVPUSH  09/15/20 13:48  25 mcg





  ONETIME ONE   Administration


 


Fentanyl  50 mcg  09/15/20 15:07 





  Sublimaze  IVPUSH  09/15/20 15:08 





  ONETIME ONE  


 


Hydromorphone HCl  1 mg  09/15/20 16:25  09/15/20 16:32





  Dilaudid  IVPUSH  09/15/20 16:26  1 mg





  ONETIME ONE   Administration


 


Sodium Chloride  1,000 mls @ 999 mls/hr  09/15/20 13:45 





  Normal Saline  IV  09/15/20 14:45 





  .BOLUS ONE  


 


Sodium Chloride  1,000 mls @ 999 mls/hr  09/15/20 13:52  09/15/20 14:33





  Normal Saline  IV  09/15/20 14:52  999 mls/hr





  .BOLUS ONE   Administration


 


Ondansetron HCl  4 mg  09/15/20 13:46  09/15/20 14:34





  Zofran  IVPUSH  09/15/20 13:47  4 mg





  ONETIME ONE   Administration


 


Sodium Chloride  10 ml  09/15/20 14:01  09/15/20 15:02





  Saline Flush  FLUSH  09/15/20 14:02  10 ml





  ONETIME ONE   Administration














Departure





- Departure


Time of Disposition: 16:38


Condition: Fair





Sepsis Event Note (ED)





- Focused Exam


Vital Signs: 





                                   Vital Signs











  Temp Pulse Resp BP Pulse Ox


 


 09/15/20 15:00   80   132/90 


 


 09/15/20 13:14  97.3 F  85  16  143/89 H  99


 


 09/15/20 13:11  97.3 F  85  16  143/89 H  99














- My Orders


Last 24 Hours: 





My Active Orders





09/15/20 14:15


Iopamidol [Isovue-300 (61%)]   100 ml IV .AS DIRECTED 


Sodium Chloride 0.9% [Normal Saline] 100 ml IV ASDIRECTED 














- Assessment/Plan


Last 24 Hours: 





My Active Orders





09/15/20 14:15


Iopamidol [Isovue-300 (61%)]   100 ml IV .AS DIRECTED 


Sodium Chloride 0.9% [Normal Saline] 100 ml IV ASDIRECTED 











Plan: 





Assessment





Acuity = acute





Site and laterality = chronic pancreatitis with acute flareup complicated 

patient with known history of Crohn's disease





Etiology  = unknown





Manifestations = abdominal pain





Location of injury =  Home





Lab values = WBC elevated 12.0 consistent leukocytosis, hemoglobin slightly 

elevated 16.2 albumin low 2.9 consistent with hypoalbuminemia lipase normal at 

331 CT scan does describe a chronic pancreatitis with 2 pseudocyst that had in 

the pancreas that have become more complex over the last for 5 months





Plan


Call discussed case Dr. Baron at [address] [facility] emergency room 

physician can accept the patient in transport will be transported via EMS ground

for further evaluation

















 This note was dictated using dragon voice recognition software please call with

any questions on syntax or grammar.

## 2020-10-27 ENCOUNTER — HOSPITAL ENCOUNTER (EMERGENCY)
Dept: HOSPITAL 11 - JP.ED | Age: 55
Discharge: HOME | End: 2020-10-27
Payer: MEDICAID

## 2020-10-27 DIAGNOSIS — K86.1: ICD-10-CM

## 2020-10-27 DIAGNOSIS — K85.90: Primary | ICD-10-CM

## 2020-10-27 DIAGNOSIS — F17.210: ICD-10-CM

## 2020-10-27 PROCEDURE — 85025 COMPLETE CBC W/AUTO DIFF WBC: CPT

## 2020-10-27 PROCEDURE — 81001 URINALYSIS AUTO W/SCOPE: CPT

## 2020-10-27 PROCEDURE — 99284 EMERGENCY DEPT VISIT MOD MDM: CPT

## 2020-10-27 PROCEDURE — 96375 TX/PRO/DX INJ NEW DRUG ADDON: CPT

## 2020-10-27 PROCEDURE — 93005 ELECTROCARDIOGRAM TRACING: CPT

## 2020-10-27 PROCEDURE — 80053 COMPREHEN METABOLIC PANEL: CPT

## 2020-10-27 PROCEDURE — 84484 ASSAY OF TROPONIN QUANT: CPT

## 2020-10-27 PROCEDURE — 96376 TX/PRO/DX INJ SAME DRUG ADON: CPT

## 2020-10-27 PROCEDURE — 83690 ASSAY OF LIPASE: CPT

## 2020-10-27 PROCEDURE — 96374 THER/PROPH/DIAG INJ IV PUSH: CPT

## 2020-10-27 PROCEDURE — 71275 CT ANGIOGRAPHY CHEST: CPT

## 2020-10-27 PROCEDURE — 36415 COLL VENOUS BLD VENIPUNCTURE: CPT

## 2020-10-27 PROCEDURE — 80307 DRUG TEST PRSMV CHEM ANLYZR: CPT

## 2020-10-27 PROCEDURE — 82150 ASSAY OF AMYLASE: CPT

## 2020-10-27 NOTE — CRLCT
INDICATION:



Elevated D-dimer



TECHNIQUE:



CT chest PE was acquired with 100 cc Isovue 370 intravenous contrast.



COMPARISON:



None.



FINDINGS:



Heart and vasculature: Contrast opacification of the pulmonary arterial 

tree is adequate. No sign of pulmonary embolism. Atherosclerosis. Thoracic 

aorta is normal in caliber. No pericardial effusion. 



Lungs and pleural: No pleural effusion or pneumothorax. Mild biapical 

pleural parenchymal scarring. Mild centrilobular emphysema. Minimal areas 

of discoid atelectasis. 



Lymph nodes/mediastinum: No mediastinal, hilar, or axillary adenopathy. 



Chest wall: No masses. 



Upper abdomen: A subcentimeter hepatic cyst at the dome. Status post 

cholecystectomy. Extensive calcification throughout the pancreas with mild 

fat stranding surrounding the pancreas as well as small free fluid 

extending into the left upper quadrant adjacent to the stomach. 



Bones: Unremarkable for age. 



IMPRESSION:



1. No evidence of pulmonary embolus.



2. Mild centrilobular emphysema.



3. Pancreatic calcifications with mild adjacent fat stranding and some free 

fluid in the left upper quadrant extending towards the diaphragm. 

Appearance suggests acute on chronic pancreatitis.



Please note that all CT scans at this facility use dose modulation, 

iterative reconstruction, and/or weight-based dosing when appropriate to 

reduce radiation dose to as low as reasonably achievable.



Dictated by Mohit Booker MD @ Oct 27 2020  6:19PM



Signed by Dr. Mohit Booker @ Oct 27 2020  6:25PM

## 2020-10-27 NOTE — EDM.PDOC
<Rosalina Hameed - Last Filed: 10/27/20 18:50>





ED HPI GENERAL MEDICAL PROBLEM





- General


Chief Complaint: Abdominal Pain


Stated Complaint: PANCREATITIS


Time Seen by Provider: 10/27/20 16:35


Source of Information: Reports: Patient


History Limitations: Reports: No Limitations





- History of Present Illness


INITIAL COMMENTS - FREE TEXT/NARRATIVE: 





pt arrives with upper abdomanal pain. He has a history  of upper  abdomanal pin.

He has recently been seen at the clinic and  had atypical chest pain at that 

time. He did have a ddimer that was greater than 3000. He astates that is part 

of the reason he is here because he was informed of this. He also feels like his

pancreatitis  has recently flared. He is not particularly sob at this time. He 

has not had pleuritic chest. 


Onset: Gradual


Duration: Day(s):


Location: Reports: Chest, Abdomen


Associated Symptoms: Reports: Chest Pain, Shortness of Breath, Other (pt is 

having upper abdomanal pain. )


  ** Abdominal


Pain Score (Numeric/FACES): 5





- Related Data


                                    Allergies











Allergy/AdvReac Type Severity Reaction Status Date / Time


 


No Known Allergies Allergy   Verified 10/27/20 14:56











Home Meds: 


                                    Home Meds





Omeprazole 1 tab PO DAILY PRN 06/21/20 [History]


predniSONE [Prednisone] 1 tab PO DAILY #30 06/23/20 [Rx]


Folic Acid 1 tab PO DAILY 10/27/20 [History]


Hydrocod/Acetam 1 tab PO Q4HR PRN 10/27/20 [History]


Lipase/Protease/Amylase [Creon Dr 36,000 Units Capsule] 1 each PO BID 10/27/20 

[History]


Magnesium Chloride [Mag-64] 1 cap PO DAILY 10/27/20 [History]


Multivit,Calc,Mins/Iron/Folic [Thera-M] 1 cap PO DAILY 10/27/20 [History]


Potassium Chloride [Klor-Con] 20 meq PO DAILY 10/27/20 [History]


Thiamine HCl [Vitamin B-1] 250 mg PO DAILY 10/27/20 [History]











Past Medical History


HEENT History: Reports: None


Cardiovascular History: Reports: None


Respiratory History: Reports: None


Gastrointestinal History: Reports: Chronic Diarrhea, GERD, Inflammatory Bowel 

Disease, Pancreatitis


Other Gastrointestinal History: chrons


Genitourinary History: Reports: None


Musculoskeletal History: Reports: Other (See Below)


Other Musculoskeletal History: crush injury r knee  3 to 4 years ago.


Psychiatric History: Reports: None


Hematologic History: Reports: None


Immunologic History: Reports: None


Oncologic (Cancer) History: Reports: None





- Infectious Disease History


Infectious Disease History: Reports: Chicken Pox





- Past Surgical History


HEENT Surgical History: Reports: None


GI Surgical History: Reports: Cholecystectomy, Colon, Colonoscopy, Hernia 

Repair/Other, Small Bowel, Other (See Below)


Other GI Surgeries/Procedures: MADHURI





Social & Family History





- Family History


Family Medical History: Noncontributory


Oncologic: Reports: Other (See Below)





- Tobacco Use


Tobacco Use Status *Q: Current Every Day Tobacco User


Years of Tobacco use: 40


Packs/Tins Daily: 2





- Caffeine Use


Caffeine Use: Reports: Coffee





- Recreational Drug Use


Recreational Drug Use: Yes


Recreational Drug Type: Reports: Marijuana/Hashish





ED ROS GENERAL





- Review of Systems


Review Of Systems: See Below


Constitutional: Reports: No Symptoms


HEENT: Reports: No Symptoms


Respiratory: Reports: Shortness of Breath


Cardiovascular: Reports: Chest Pain, Other (pt was seen at the clinic with chest

 pain. )


Endocrine: Reports: No Symptoms


GI/Abdominal: Reports: Other (pt has upper abdomanal pain. )


: Reports: No Symptoms


Musculoskeletal: Reports: No Symptoms


Skin: Reports: No Symptoms


Neurological: Reports: No Symptoms


Psychiatric: Reports: Anxiety





ED EXAM, GI/ABD





- Physical Exam


Exam: See Below


Text/Narrative:: 





pt arrived with pain in the upper abdoman. He was seen at the clinic with chest 

pain which was not pleuritic. 


Exam Limited By: No Limitations


General Appearance: Alert, Anxious, Moderate Distress


Ears: Normal TMs


Nose: Normal Inspection


Throat/Mouth: Normal Inspection


Head: Atraumatic


Neck: Normal Inspection


Respiratory/Chest: No Respiratory Distress


Cardiovascular: Regular Rate, Rhythm


GI/Abdominal Exam: Tender, Other ( tender in the epigastric area. )


 (Male) Exam: Deferred


Rectal (Males) Exam: Deferred





Course





- Re-Assessments/Exams


Free Text/Narrative Re-Assessment/Exam: 





10/27/20 17:18


pt had a ddimer at the clinic greatwr than 3000. He is also having epigastric 

pain.  He thinks his pancreatitis has flared. 


10/27/20 18:51


pt had a cat scan of the chest which was neg for PE. He ha fluid around his 

pancreas but his enzymes are not elevated. He is now nauseated and has some 

increased pain. The pt contues to use etoh on a regular basis. 





Departure





- Departure


Disposition: Home, Self-Care 01


Clinical Impression: 


 Acute on chronic pancreatitis








- Discharge Information


Instructions:  Acute Pancreatitis, Easy-to-Read


Referrals: 


Salina Heath MD [Primary Care Provider] - 


Forms:  ED Department Discharge


Additional Instructions: 


Please use the oxycodone as needed for pain control, continue with your regular 

medications, limit yourself to clear liquids for bowel rest,  please followup 

with your primary care provider in 3-5 days if not better, please call return to

the emergency department with worsening of symptoms.





Sepsis Event Note (ED)





- Evaluation


Sepsis Screening Result: No Definite Risk





<OfficerKapil - Last Filed: 10/27/20 20:50>





Course





- Vital Signs


Last Recorded V/S: 





                                Last Vital Signs











Temp  98.3 F   10/27/20 14:49


 


Pulse  118 H  10/27/20 14:49


 


Resp  16   10/27/20 17:46


 


BP  150/88 H  10/27/20 17:46


 


Pulse Ox  97   10/27/20 17:46














- Orders/Labs/Meds


Orders: 





                               Active Orders 24 hr











 Category Date Time Status


 


 EKG Documentation Completion [RC] ASDIRECTED Care  10/27/20 17:14 Active


 


 Iopamidol [Isovue-370 (76%)] Med  10/27/20 17:45 Active





 100 ml IV .AS DIRECTED   


 


 Sodium Chloride 0.9% [Normal Saline] 1,000 ml Med  10/27/20 16:45 Active





 IV ASDIRECTED   


 


 Sodium Chloride 0.9% [Normal Saline] 1,000 ml Med  10/27/20 18:45 Active





 IV ASDIRECTED   


 


 Sodium Chloride 0.9% [Normal Saline] 100 ml Med  10/27/20 17:45 Active





 IV ASDIRECTED   


 


 Sodium Chloride 0.9% [Saline Flush] Med  10/27/20 17:39 Active





 10 ml FLUSH ASDIRECTED PRN   


 


 EKG 12 Lead [EK] Routine Ther  10/27/20 17:14 Ordered








                                Medication Orders





Sodium Chloride (Normal Saline)  1,000 mls @ 999 mls/hr IV ASDIRECTED DAPHNE


   Last Admin: 10/27/20 16:45  Dose: 999 mls/hr


   Documented by: MIRIAM


Sodium Chloride (Normal Saline)  100 mls @ 3 mls/sec IV ASDIRECTED DAPHNE


   Last Admin: 10/27/20 17:55  Dose: 3 mls/sec


   Documented by: NIKOLAY


Sodium Chloride (Normal Saline)  1,000 mls @ 400 mls/hr IV ASDIRECTED DAPHNE


   Last Admin: 10/27/20 18:49  Dose: 400 mls/hr


   Documented by: PREILOR


Iopamidol (Isovue-370 (76%))  100 ml IV .AS DIRECTED DAPHNE


   Last Admin: 10/27/20 17:55  Dose: 100 ml


   Documented by: NIKOLAY


Sodium Chloride (Saline Flush)  10 ml FLUSH ASDIRECTED PRN


   PRN Reason: Keep Vein Open


   Last Admin: 10/27/20 17:50  Dose: 10 ml


   Documented by: NIKOLAY








Labs: 





                                Laboratory Tests











  10/27/20 10/27/20 10/27/20 Range/Units





  16:33 16:33 16:39 


 


WBC    16.5 H  (4.5-11.0)  K/uL


 


RBC    5.61  (4.30-5.90)  M/uL


 


Hgb    15.7 H  (12.0-15.0)  g/dL


 


Hct    46.9  (40.0-54.0)  %


 


MCV    84  (80-98)  fL


 


MCH    28  (27-31)  pg


 


MCHC    34  (32-36)  %


 


Plt Count    384  (150-400)  K/uL


 


Neut % (Auto)    87 H  (36-66)  %


 


Lymph % (Auto)    4 L  (24-44)  %


 


Mono % (Auto)    8 H  (2-6)  %


 


Eos % (Auto)    1 L  (2-4)  %


 


Baso % (Auto)    0  (0-1)  %


 


Sodium     (140-148)  mmol/L


 


Potassium     (3.6-5.2)  mmol/L


 


Chloride     (100-108)  mmol/L


 


Carbon Dioxide     (21-32)  mmol/L


 


Anion Gap     (5.0-14.0)  mmol/L


 


BUN     (7-18)  mg/dL


 


Creatinine     (0.8-1.3)  mg/dL


 


Est Cr Clr Drug Dosing     mL/min


 


Estimated GFR (MDRD)     (>60)  


 


Glucose     ()  mg/dL


 


Calcium     (8.5-10.1)  mg/dL


 


Total Bilirubin     (0.2-1.0)  mg/dL


 


AST     (15-37)  U/L


 


ALT     (12-78)  U/L


 


Alkaline Phosphatase     ()  U/L


 


Troponin I  < 0.017    (0.000-0.056)  ng/mL


 


Total Protein     (6.4-8.2)  g/dL


 


Albumin     (3.4-5.0)  g/dL


 


Globulin     (2.3-3.5)  g/dL


 


Albumin/Globulin Ratio     (1.2-2.2)  


 


Amylase     ()  U/L


 


Lipase     ()  U/L


 


Urine Color     (YELLOW)  


 


Urine Appearance     (CLEAR)  


 


Urine pH     (5.0-8.0)  


 


Ur Specific Gravity     (1.008-1.030)  


 


Urine Protein     (NEGATIVE)  mg/dL


 


Urine Glucose (UA)     (NEGATIVE)  mg/dL


 


Urine Ketones     (NEGATIVE)  mg/dL


 


Urine Occult Blood     (NEGATIVE)  


 


Urine Nitrite     (NEGATIVE)  


 


Urine Bilirubin     (NEGATIVE)  


 


Urine Urobilinogen     (0.2-1.0)  EU/dL


 


Ur Leukocyte Esterase     (NEGATIVE)  


 


Urine RBC     (0-5)  


 


Urine WBC     (0-5)  


 


Ur Epithelial Cells     


 


Amorphous Sediment     


 


Urine Bacteria     


 


Urine Mucus     


 


Ethyl Alcohol   < 3   mg/dL














  10/27/20 10/27/20 Range/Units





  16:39 18:37 


 


WBC    (4.5-11.0)  K/uL


 


RBC    (4.30-5.90)  M/uL


 


Hgb    (12.0-15.0)  g/dL


 


Hct    (40.0-54.0)  %


 


MCV    (80-98)  fL


 


MCH    (27-31)  pg


 


MCHC    (32-36)  %


 


Plt Count    (150-400)  K/uL


 


Neut % (Auto)    (36-66)  %


 


Lymph % (Auto)    (24-44)  %


 


Mono % (Auto)    (2-6)  %


 


Eos % (Auto)    (2-4)  %


 


Baso % (Auto)    (0-1)  %


 


Sodium  134 L   (140-148)  mmol/L


 


Potassium  3.8   (3.6-5.2)  mmol/L


 


Chloride  98 L   (100-108)  mmol/L


 


Carbon Dioxide  30   (21-32)  mmol/L


 


Anion Gap  9.8   (5.0-14.0)  mmol/L


 


BUN  7   (7-18)  mg/dL


 


Creatinine  0.9   (0.8-1.3)  mg/dL


 


Est Cr Clr Drug Dosing  76.21   mL/min


 


Estimated GFR (MDRD)  > 60   (>60)  


 


Glucose  141 H   ()  mg/dL


 


Calcium  9.2   (8.5-10.1)  mg/dL


 


Total Bilirubin  0.2   (0.2-1.0)  mg/dL


 


AST  13 L   (15-37)  U/L


 


ALT  17   (12-78)  U/L


 


Alkaline Phosphatase  148 H   ()  U/L


 


Troponin I    (0.000-0.056)  ng/mL


 


Total Protein  6.8   (6.4-8.2)  g/dL


 


Albumin  2.6 L   (3.4-5.0)  g/dL


 


Globulin  4.2 H   (2.3-3.5)  g/dL


 


Albumin/Globulin Ratio  0.6 L   (1.2-2.2)  


 


Amylase  83   ()  U/L


 


Lipase  149   ()  U/L


 


Urine Color   Yellow  (YELLOW)  


 


Urine Appearance   Slightly cloudy A  (CLEAR)  


 


Urine pH   5.5  (5.0-8.0)  


 


Ur Specific Gravity   1.020  (1.008-1.030)  


 


Urine Protein   Negative  (NEGATIVE)  mg/dL


 


Urine Glucose (UA)   Negative  (NEGATIVE)  mg/dL


 


Urine Ketones   Negative  (NEGATIVE)  mg/dL


 


Urine Occult Blood   Negative  (NEGATIVE)  


 


Urine Nitrite   Negative  (NEGATIVE)  


 


Urine Bilirubin   Small H  (NEGATIVE)  


 


Urine Urobilinogen   0.2  (0.2-1.0)  EU/dL


 


Ur Leukocyte Esterase   Negative  (NEGATIVE)  


 


Urine RBC   0-5  (0-5)  


 


Urine WBC   0-5  (0-5)  


 


Ur Epithelial Cells   Rare  


 


Amorphous Sediment   Not seen  


 


Urine Bacteria   Not seen  


 


Urine Mucus   Many  


 


Ethyl Alcohol    mg/dL











Meds: 





Medications











Generic Name Dose Route Start Last Admin





  Trade Name Freq  PRN Reason Stop Dose Admin


 


Sodium Chloride  1,000 mls @ 999 mls/hr  10/27/20 16:45  10/27/20 16:45





  Normal Saline  IV   999 mls/hr





  ASDIRECTED DAPHNE   Administration


 


Sodium Chloride  100 mls @ 3 mls/sec  10/27/20 17:45  10/27/20 17:55





  Normal Saline  IV   3 mls/sec





  ASDIRECTED DAPHNE   Administration


 


Sodium Chloride  1,000 mls @ 400 mls/hr  10/27/20 18:45  10/27/20 18:49





  Normal Saline  IV   400 mls/hr





  ASDIRECTED DAPHNE   Administration


 


Iopamidol  100 ml  10/27/20 17:45  10/27/20 17:55





  Isovue-370 (76%)  IV   100 ml





  .AS DIRECTED DAPHNE   Administration


 


Sodium Chloride  10 ml  10/27/20 17:39  10/27/20 17:50





  Saline Flush  FLUSH   10 ml





  ASDIRECTED PRN   Administration





  Keep Vein Open  














Discontinued Medications














Generic Name Dose Route Start Last Admin





  Trade Name Freq  PRN Reason Stop Dose Admin


 


Al Hydroxide/Mg Hydroxide 15  0 ml  10/27/20 18:38  10/27/20 19:00





  ml/ Lidocaine HCl 15 ml  PO  10/27/20 18:39  30 ml





  ONETIME ONE   Administration


 


Hydromorphone HCl  0.5 mg  10/27/20 16:33  10/27/20 16:45





  Dilaudid  IVPUSH  10/27/20 16:34  0.5 mg





  ONETIME ONE   Administration


 


Hydromorphone HCl  0.5 mg  10/27/20 18:18  10/27/20 18:25





  Dilaudid  IVPUSH  10/27/20 18:19  0.5 mg





  ONETIME ONE   Administration


 


Ondansetron HCl  4 mg  10/27/20 18:50  10/27/20 19:00





  Zofran  IVPUSH  10/27/20 18:51  4 mg





  ONETIME ONE   Administration














Departure





- Departure


Time of Disposition: 20:49


Condition: Poor





Sepsis Event Note (ED)





- Focused Exam


Vital Signs: 





                                   Vital Signs











  Temp Pulse Resp BP Pulse Ox


 


 10/27/20 17:46    16  150/88 H  97


 


 10/27/20 14:49  98.3 F  118 H  16  146/107 H  97














- Assessment/Plan


Plan: 








Took over care from Dr. Hameed at 1900


Assessment





Acuity = acute





Site and laterality = on chronic pancreatitis





Etiology  = probably related to alcohol abuse and dependence





Manifestations = chronic abdominal pain





Location of injury =  Home





Lab values = WBC elevated 16.5 consistent leukocytosis sodium low at 134 

consistent hyponatremia troponin was negative urinalysis negative EtOH negative 

CT scan describes acute on chronic pancreatitis, angiogram negative for any 

pulmonary embolism





Plan


I did offer him admission however we are read alert at this time which were 

required transfer to another facility he declined he would prefer just to go 

home therefore prescription written for oxycodone 5/325 1 tab p.o. 3 times daily

 as needed total #10 he will follow-up with his primary care in the next 3 to 5 

days for reevaluation

















 This note was dictated using dragon voice recognition software please call with

 any questions on syntax or grammar.

## 2021-01-21 ENCOUNTER — HOSPITAL ENCOUNTER (EMERGENCY)
Dept: HOSPITAL 11 - JP.ED | Age: 56
Discharge: NURSING, REHAB OR LONG-TERM CARE | End: 2021-01-21
Payer: MEDICAID

## 2021-01-21 DIAGNOSIS — K66.1: ICD-10-CM

## 2021-01-21 DIAGNOSIS — D73.5: Primary | ICD-10-CM

## 2021-01-21 DIAGNOSIS — Z20.822: ICD-10-CM

## 2021-01-21 DIAGNOSIS — I95.9: ICD-10-CM

## 2021-01-21 DIAGNOSIS — Z72.0: ICD-10-CM

## 2021-01-21 PROCEDURE — 36415 COLL VENOUS BLD VENIPUNCTURE: CPT

## 2021-01-21 PROCEDURE — 82150 ASSAY OF AMYLASE: CPT

## 2021-01-21 PROCEDURE — 87635 SARS-COV-2 COVID-19 AMP PRB: CPT

## 2021-01-21 PROCEDURE — 86850 RBC ANTIBODY SCREEN: CPT

## 2021-01-21 PROCEDURE — 36430 TRANSFUSION BLD/BLD COMPNT: CPT

## 2021-01-21 PROCEDURE — 80307 DRUG TEST PRSMV CHEM ANLYZR: CPT

## 2021-01-21 PROCEDURE — 86901 BLOOD TYPING SEROLOGIC RH(D): CPT

## 2021-01-21 PROCEDURE — 96366 THER/PROPH/DIAG IV INF ADDON: CPT

## 2021-01-21 PROCEDURE — 99285 EMERGENCY DEPT VISIT HI MDM: CPT

## 2021-01-21 PROCEDURE — 80053 COMPREHEN METABOLIC PANEL: CPT

## 2021-01-21 PROCEDURE — 83690 ASSAY OF LIPASE: CPT

## 2021-01-21 PROCEDURE — 86140 C-REACTIVE PROTEIN: CPT

## 2021-01-21 PROCEDURE — 96376 TX/PRO/DX INJ SAME DRUG ADON: CPT

## 2021-01-21 PROCEDURE — 85025 COMPLETE CBC W/AUTO DIFF WBC: CPT

## 2021-01-21 PROCEDURE — 96365 THER/PROPH/DIAG IV INF INIT: CPT

## 2021-01-21 PROCEDURE — 86920 COMPATIBILITY TEST SPIN: CPT

## 2021-01-21 PROCEDURE — 86922 COMPATIBILITY TEST ANTIGLOB: CPT

## 2021-01-21 PROCEDURE — 74177 CT ABD & PELVIS W/CONTRAST: CPT

## 2021-01-21 PROCEDURE — P9016 RBC LEUKOCYTES REDUCED: HCPCS

## 2021-01-21 PROCEDURE — 85610 PROTHROMBIN TIME: CPT

## 2021-01-21 PROCEDURE — 86900 BLOOD TYPING SEROLOGIC ABO: CPT

## 2021-01-21 PROCEDURE — 83605 ASSAY OF LACTIC ACID: CPT

## 2021-01-21 PROCEDURE — U0002 COVID-19 LAB TEST NON-CDC: HCPCS

## 2021-01-21 PROCEDURE — 96375 TX/PRO/DX INJ NEW DRUG ADDON: CPT

## 2021-01-21 PROCEDURE — 99281 EMR DPT VST MAYX REQ PHY/QHP: CPT

## 2021-01-21 NOTE — EDM.PDOC
<OfficerKapil - Last Filed: 01/21/21 19:53>





ED HPI GENERAL MEDICAL PROBLEM





- General


Chief Complaint: Abdominal Pain


Stated Complaint: MEDICAL VIA NORTH


Time Seen by Provider: 01/21/21 16:13





- Related Data


                                    Allergies











Allergy/AdvReac Type Severity Reaction Status Date / Time


 


No Known Allergies Allergy   Verified 01/21/21 15:23











Home Meds: 


                                    Home Meds





Omeprazole 1 tab PO DAILY PRN 06/21/20 [History]


predniSONE [Prednisone] 1 tab PO DAILY #30 06/23/20 [Rx]


Folic Acid 1 tab PO DAILY 10/27/20 [History]


Hydrocod/Acetam 1 tab PO Q4HR PRN 10/27/20 [History]


Lipase/Protease/Amylase [Creon Dr 36,000 Units Capsule] 1 each PO BID 10/27/20 

[History]


Magnesium Chloride [Mag-64] 1 cap PO DAILY 10/27/20 [History]


Multivit,Calc,Mins/Iron/Folic [Thera-M] 1 cap PO DAILY 10/27/20 [History]


Potassium Chloride [Klor-Con] 20 meq PO DAILY 10/27/20 [History]


Thiamine HCl [Vitamin B-1] 250 mg PO DAILY 10/27/20 [History]











Departure





- Departure


Time of Disposition: 19:55


Disposition: DC[facility] to [facility]


Condition: Critical


Clinical Impression: 


Spleen hematoma


Qualifiers:


 Encounter type: initial encounter Qualified Code(s): S36.029A - Unspecified 

contusion of spleen, initial encounter








- Discharge Information


Referrals: 


PCP,None [Primary Care Provider] - 


Forms:  ED Department Discharge





- Assessment/Plan


Plan: 





Assessment





Acuity = acute





Site and laterality = subscapular splenic hematoma with hemoperitoneum





Etiology  = unknown





Manifestations = none





Location of injury =  Home





Lab values = WBC elevated 26.3 consistent with leukocytosis hemoglobin normal 

11.5 platelets elevated 532 consistent with thrombocytosis sodium low at 137 

consistent hyponatremia potassium low 2.9 consistent hypokalemia CRP slightly 

elevated 1.53 lipase is normal at 290 alcohol is negative CT scan describes the 

subcapsular splenic hematoma





Plan


Call discussed case Dr. Zepeda [facility] emergency room physician kindly except

the patient at 1947 he will be transported via EMS ground however if vital signs

will change may need to change that to care if he becomes unstable

















 This note was dictated using dragon voice recognition software please call with

any questions on syntax or grammar.





<Rosalina Hameed - Last Filed: 01/22/21 07:19>





ED HPI GENERAL MEDICAL PROBLEM





- General


Source of Information: Reports: Patient


History Limitations: Reports: No Limitations





- History of Present Illness


INITIAL COMMENTS - FREE TEXT/NARRATIVE: 





pt has a long huistory of pancreatis. He aRRIVED BY AMBULANCE  WITH SEVERE 

ABDOMANAL PAIN WHICH STARTED 2-3 DAYS AGO.  hE HAS BEEN VOMITING. 


Onset: Gradual, Other (MUCH WORSE TODAY. )


Duration: Hour(s):


Location: Reports: Abdomen


Associated Symptoms: Reports: Weakness





Past Medical History


HEENT History: Reports: None


Cardiovascular History: Reports: None


Respiratory History: Reports: None


Gastrointestinal History: Reports: Chronic Diarrhea, GERD, Inflammatory Bowel 

Disease, Pancreatitis


Other Gastrointestinal History: chrons


Genitourinary History: Reports: None


Musculoskeletal History: Reports: Other (See Below)


Other Musculoskeletal History: crush injury r knee  3 to 4 years ago.


Psychiatric History: Reports: None


Hematologic History: Reports: None


Immunologic History: Reports: None


Oncologic (Cancer) History: Reports: None





- Infectious Disease History


Infectious Disease History: Reports: Chicken Pox





- Past Surgical History


HEENT Surgical History: Reports: None


GI Surgical History: Reports: Cholecystectomy, Colon, Colonoscopy, Hernia 

Repair/Other, Small Bowel, Other (See Below)


Other GI Surgeries/Procedures: MADHURI





Social & Family History





- Family History


Family Medical History: No Pertinent Family History


Oncologic: Reports: Other (See Below)





- Tobacco Use


Tobacco Use Status *Q: Current Every Day Tobacco User


Years of Tobacco use: 40


Packs/Tins Daily: 2





- Caffeine Use


Caffeine Use: Reports: Coffee





- Recreational Drug Use


Recreational Drug Use: Yes


Recreational Drug Type: Reports: Marijuana/Hashish


Recreational Drug Use Frequency: Daily





ED ROS GENERAL





- Review of Systems


Review Of Systems: See Below


Constitutional: Reports: No Symptoms


HEENT: Reports: No Symptoms


Respiratory: Reports: Shortness of Breath


Cardiovascular: Reports: No Symptoms


Endocrine: Reports: No Symptoms


GI/Abdominal: Reports: Abdominal Pain, Nausea, Vomiting


: Reports: No Symptoms


Musculoskeletal: Reports: No Symptoms


Skin: Reports: No Symptoms


Neurological: Reports: No Symptoms


Psychiatric: Reports: No Symptoms


Hematologic/Lymphatic: Reports: No Symptoms





ED EXAM, GI/ABD





- Physical Exam


Exam: See Below


Text/Narrative:: 





PT ARRIVED WITH SEVERE UPPER ABDOMANAL PAIN.  hE  STATES THIS STARTED TO GET 

WORSE YESTERDAY.  hE HAS A LONG HISTORY OF PANCREATITIS RECURRENT. 


Exam Limited By: No Limitations


General Appearance: Alert, Anxious, Moderate Distress


Ears: Normal TMs


Nose: Normal Inspection


Throat/Mouth: Normal Inspection


Head: Atraumatic


Neck: Normal Inspection


Respiratory/Chest: No Respiratory Distress


Cardiovascular: Regular Rate, Rhythm, Tachycardia


GI/Abdominal Exam: Guarding, Rigid


 (Male) Exam: Deferred


Rectal (Males) Exam: Deferred


Back Exam: Normal Inspection


Extremities: Normal Inspection


Neurological: Alert, Oriented, Normal Cognition





Course





- Vital Signs


Last Recorded V/S: 


                                Last Vital Signs











Temp  35.8 C L  01/21/21 20:27


 


Pulse  138 H  01/21/21 20:27


 


Resp  27 H  01/21/21 20:27


 


BP  94/55 L  01/21/21 20:27


 


Pulse Ox  100   01/21/21 20:27














- Orders/Labs/Meds


Orders: 


                               Active Orders 24 hr











 Category Date Time Status


 


 FRESH FROZEN PLASMA [BBK] Stat Lab  01/21/21 16:23 Results


 


 PATIENT RETYPE [BBK] Stat Lab  01/21/21 16:23 Results


 


 RED BLOOD CELLS LP [BBK] Stat Lab  01/21/21 16:23 Results


 


 TYPE AND SCREEN [BBK] Stat Lab  01/21/21 16:23 Results


 


 Peripheral IV Insertion Adult [OM.PC] Urgent Oth  01/21/21 19:53 Ordered


 


 Transfuse Red Blood Cells [COMM] Stat Oth  01/21/21 20:25 Ordered











Labs: 


                                Laboratory Tests











  01/21/21 01/21/21 01/21/21 Range/Units





  16:22 16:22 16:22 


 


WBC   26.3 H   (4.5-11.0)  K/uL


 


RBC   4.16 L   (4.30-5.90)  M/uL


 


Hgb   11.5 L D   (12.0-15.0)  g/dL


 


Hct   34.9 L   (40.0-54.0)  %


 


MCV   84   (80-98)  fL


 


MCH   28   (27-31)  pg


 


MCHC   33   (32-36)  %


 


Plt Count   532 H   (150-400)  K/uL


 


Neut % (Auto)   86 H   (36-66)  %


 


Lymph % (Auto)   5 L   (24-44)  %


 


Mono % (Auto)   8 H   (2-6)  %


 


Eos % (Auto)   0 L   (2-4)  %


 


Baso % (Auto)   0   (0-1)  %


 


PT     (9.5-12.0)  sec


 


INR     (0.80-1.20)  


 


Sodium    137 L  (140-148)  mmol/L


 


Potassium    2.9 L*  (3.6-5.2)  mmol/L


 


Chloride    100  (100-108)  mmol/L


 


Carbon Dioxide    27  (21-32)  mmol/L


 


Anion Gap    12.9  (5.0-14.0)  mmol/L


 


BUN    7  (7-18)  mg/dL


 


Creatinine    0.7 L  (0.8-1.3)  mg/dL


 


Est Cr Clr Drug Dosing    97.11  mL/min


 


Estimated GFR (MDRD)    > 60  (>60)  


 


Glucose    160 H  ()  mg/dL


 


Lactic Acid     (0.4-2.0)  mmol/L


 


Calcium    8.4 L  (8.5-10.1)  mg/dL


 


Total Bilirubin    0.2  (0.2-1.0)  mg/dL


 


AST    17  (15-37)  U/L


 


ALT    13  (12-78)  U/L


 


Alkaline Phosphatase    97  ()  U/L


 


C-Reactive Protein     (0.0-0.3)  mg/dL


 


Total Protein    5.4 L  (6.4-8.2)  g/dL


 


Albumin    2.2 L  (3.4-5.0)  g/dL


 


Globulin    3.2  (2.3-3.5)  g/dL


 


Albumin/Globulin Ratio    0.7 L  (1.2-2.2)  


 


Amylase    137 H D  ()  U/L


 


Lipase  290    ()  U/L


 


Ethyl Alcohol     mg/dL


 


SARS CoV-2 RNA Rapid EDI     


 


Blood Type     


 


Gel Antibody Screen     


 


Crossmatch     














  01/21/21 01/21/21 01/21/21 Range/Units





  16:22 16:23 18:24 


 


WBC     (4.5-11.0)  K/uL


 


RBC     (4.30-5.90)  M/uL


 


Hgb     (12.0-15.0)  g/dL


 


Hct     (40.0-54.0)  %


 


MCV     (80-98)  fL


 


MCH     (27-31)  pg


 


MCHC     (32-36)  %


 


Plt Count     (150-400)  K/uL


 


Neut % (Auto)     (36-66)  %


 


Lymph % (Auto)     (24-44)  %


 


Mono % (Auto)     (2-6)  %


 


Eos % (Auto)     (2-4)  %


 


Baso % (Auto)     (0-1)  %


 


PT     (9.5-12.0)  sec


 


INR     (0.80-1.20)  


 


Sodium     (140-148)  mmol/L


 


Potassium     (3.6-5.2)  mmol/L


 


Chloride     (100-108)  mmol/L


 


Carbon Dioxide     (21-32)  mmol/L


 


Anion Gap     (5.0-14.0)  mmol/L


 


BUN     (7-18)  mg/dL


 


Creatinine     (0.8-1.3)  mg/dL


 


Est Cr Clr Drug Dosing     mL/min


 


Estimated GFR (MDRD)     (>60)  


 


Glucose     ()  mg/dL


 


Lactic Acid     (0.4-2.0)  mmol/L


 


Calcium     (8.5-10.1)  mg/dL


 


Total Bilirubin     (0.2-1.0)  mg/dL


 


AST     (15-37)  U/L


 


ALT     (12-78)  U/L


 


Alkaline Phosphatase     ()  U/L


 


C-Reactive Protein  1.53 H    (0.0-0.3)  mg/dL


 


Total Protein     (6.4-8.2)  g/dL


 


Albumin     (3.4-5.0)  g/dL


 


Globulin     (2.3-3.5)  g/dL


 


Albumin/Globulin Ratio     (1.2-2.2)  


 


Amylase     ()  U/L


 


Lipase     ()  U/L


 


Ethyl Alcohol    < 3  mg/dL


 


SARS CoV-2 RNA Rapid EDI     


 


Blood Type   O POSITIVE   


 


Gel Antibody Screen   Negative   


 


Crossmatch   See Detail   














  01/21/21 01/21/21 01/21/21 Range/Units





  19:24 19:46 20:25 


 


WBC     (4.5-11.0)  K/uL


 


RBC     (4.30-5.90)  M/uL


 


Hgb     (12.0-15.0)  g/dL


 


Hct     (40.0-54.0)  %


 


MCV     (80-98)  fL


 


MCH     (27-31)  pg


 


MCHC     (32-36)  %


 


Plt Count     (150-400)  K/uL


 


Neut % (Auto)     (36-66)  %


 


Lymph % (Auto)     (24-44)  %


 


Mono % (Auto)     (2-6)  %


 


Eos % (Auto)     (2-4)  %


 


Baso % (Auto)     (0-1)  %


 


PT  9.5    (9.5-12.0)  sec


 


INR  0.87    (0.80-1.20)  


 


Sodium     (140-148)  mmol/L


 


Potassium     (3.6-5.2)  mmol/L


 


Chloride     (100-108)  mmol/L


 


Carbon Dioxide     (21-32)  mmol/L


 


Anion Gap     (5.0-14.0)  mmol/L


 


BUN     (7-18)  mg/dL


 


Creatinine     (0.8-1.3)  mg/dL


 


Est Cr Clr Drug Dosing     mL/min


 


Estimated GFR (MDRD)     (>60)  


 


Glucose     ()  mg/dL


 


Lactic Acid   3.3 H   (0.4-2.0)  mmol/L


 


Calcium     (8.5-10.1)  mg/dL


 


Total Bilirubin     (0.2-1.0)  mg/dL


 


AST     (15-37)  U/L


 


ALT     (12-78)  U/L


 


Alkaline Phosphatase     ()  U/L


 


C-Reactive Protein     (0.0-0.3)  mg/dL


 


Total Protein     (6.4-8.2)  g/dL


 


Albumin     (3.4-5.0)  g/dL


 


Globulin     (2.3-3.5)  g/dL


 


Albumin/Globulin Ratio     (1.2-2.2)  


 


Amylase     ()  U/L


 


Lipase     ()  U/L


 


Ethyl Alcohol     mg/dL


 


SARS CoV-2 RNA Rapid EDI    Negative  


 


Blood Type     


 


Gel Antibody Screen     


 


Crossmatch     











Meds: 


Medications














Discontinued Medications














Generic Name Dose Route Start Last Admin





  Trade Name Freq  PRN Reason Stop Dose Admin


 


Hydromorphone HCl  1 mg  01/21/21 16:11  01/21/21 16:18





  Dilaudid  IVPUSH  01/21/21 16:12  1 mg





  ONETIME ONE   Administration


 


Hydromorphone HCl  1 mg  01/21/21 19:16  01/21/21 19:25





  Dilaudid  IVPUSH  01/21/21 19:17  1 mg





  ONETIME ONE   Administration


 


Sodium Chloride  1,000 mls @ 999 mls/hr  01/21/21 16:15  01/21/21 16:19





  Normal Saline  IV   999 mls/hr





  ASDIRECTED DAPHNE   Administration


 


Potassium Chloride 20 meq/  100 mls @ 50 mls/hr  01/21/21 17:48  01/21/21 18:52





  Premix  IV  01/21/21 19:47  50 mls/hr





  ONETIME ONE   Administration


 


Sodium Chloride  80 mls @ 3 mls/sec  01/21/21 18:15  01/21/21 18:38





  Normal Saline  IV   3 mls/sec





  ASDIRECTED DAPHNE   Administration


 


Sodium Chloride  1,000 mls @ 300 mls/hr  01/21/21 18:15  01/21/21 18:52





  Normal Saline  IV   300 mls/hr





  ASDIRECTED DAPHNE   Administration


 


Iopamidol  88 ml  01/21/21 18:06  01/21/21 18:38





  Isovue-300 (61%)  IV  01/21/21 18:07  88 ml





  ONETIME ONE   Administration


 


Lidocaine HCl  2 ml  01/21/21 18:27  01/21/21 20:25





  Xylocaine-Mpf 1%  INJECT  01/21/21 18:28  Not Given





  ONETIME ONE  


 


Lorazepam  0.5 mg  01/21/21 16:11  01/21/21 16:20





  Ativan  IVPUSH  01/21/21 16:12  0.5 mg





  ONETIME ONE   Administration


 


Sodium Chloride  10 ml  01/21/21 18:06  01/21/21 18:38





  Saline Flush  FLUSH   10 ml





  ASDIRECTED PRN   Administration





  Keep Vein Open  


 


Sodium Chloride  10 ml  01/21/21 19:53 





  Saline Flush  FLUSH  





  ASDIRECTED PRN  





  Keep Vein Open  














- Re-Assessments/Exams


Free Text/Narrative Re-Assessment/Exam: 





01/22/21 07:18


pt had no history of trauma but on cat scan was found to have a speenic 

hematoma.  He had a fair amount of blood in the abdoman. He was given fluids and

 blood was started on the pt. 





Sepsis Event Note (ED)





- Evaluation


Sepsis Screening Result: No Definite Risk





- Focused Exam


Vital Signs: 


                                   Vital Signs











  Temp Pulse Resp BP Pulse Ox


 


 01/21/21 20:27  35.8 C L  138 H  27 H  94/55 L  100


 


 01/21/21 20:11   139 H  18  100/56 L  100


 


 01/21/21 19:57   150 H  19  105/69  93 L


 


 01/21/21 19:43   153 H  19  92/69  97

## 2021-01-21 NOTE — CRLCT
INDICATION:



Upper abdominal pain 



TECHNIQUE:



CT abdomen and pelvis acquired with 88 cc Isovue 300 IV contrast.



COMPARISON:



November 27, 2020 



FINDINGS:



Lower chest: Small left pleural effusion. Small hiatal hernia. 



Liver: Several hypodense lesions at the hepatic dome measuring up to 5.0 cm 

in diameter. This lesion measures 77 Hounsfield units in density. 



Spleen: Large subcapsular hematoma measuring at least 4.1 cm in width. 

There is compression of the spleen and a moderate amount of hemoperitoneum. 

No active bleeding identified. 



Pancreas: There is a stent extending from the pancreatic head to the 

duodenum. Innumerable calcifications throughout the pancreas. No 

peripancreatic fat stranding. There is air within the pancreatic duct. 



Gallbladder and bile ducts: S/p cholecystectomy. 



Adrenal glands: Unremarkable.  



Kidneys: Unremarkable.  



GI tract: Anastomotic suture in the distal ascending colon. There is a 3.3 

x 3.4 cm round mass within the left lower quadrant mesentery which is new 

compared to the prior exam, best seen on image 99 series 2 and image 45 

series 3. 



Vascular structures: Moderate atherosclerotic calcifications. 



Lymph nodes: Unremarkable.  



Miscellaneous: Unremarkable.  No free air or significant free fluid.  



Pelvic Organs: Enlarged prostate gland. 



Bones: Old L2 superior endplate fracture. 



IMPRESSION:



Large subcapsular splenic hematoma with compression of the spleen. Moderate 

amount of hemoperitoneum. No active bleeding identified.



Mesenteric mass, new compared to the prior study. This could represent 

clotted blood but neoplasm cannot be excluded. There are also new hypodense 

lesions in the liver concerning for possible metastatic disease. Consider 

PET-CT for further evaluation. 



Small left pleural effusion.



Chronic pancreatitis. Pancreatic stent is patent. 



Small hiatal hernia. 



Enlarged prostate gland. 



Status post cholecystectomy. 



Findings discussed with  Officer at 7:26 p.m.   on January 21, 2021.



Please note that all CT scans at this facility use dose modulation, 

iterative reconstruction, and/or weight-based dosing when appropriate to 

reduce radiation dose to as low as reasonably achievable.



Dictated by Salina Lozano MD @ Jan 21 2021  6:56PM



Signed by Dr. Salina Lozano @ Jan 21 2021  7:27PM

## 2021-02-18 ENCOUNTER — HOSPITAL ENCOUNTER (EMERGENCY)
Dept: HOSPITAL 11 - JP.ED | Age: 56
Discharge: NURSING, REHAB OR LONG-TERM CARE | End: 2021-02-18
Payer: MEDICAID

## 2021-02-18 DIAGNOSIS — Z72.0: ICD-10-CM

## 2021-02-18 DIAGNOSIS — R50.9: ICD-10-CM

## 2021-02-18 DIAGNOSIS — Z79.899: ICD-10-CM

## 2021-02-18 DIAGNOSIS — K21.9: ICD-10-CM

## 2021-02-18 DIAGNOSIS — R10.30: Primary | ICD-10-CM

## 2021-02-18 NOTE — EDM.PDOC
ED HPI GENERAL MEDICAL PROBLEM





- General


Chief Complaint: Fever


Stated Complaint: MEDICAL VIA NORTH


Time Seen by Provider: 02/18/21 12:10


Source of Information: Reports: Patient, EMS, Nursing Home Records


History Limitations: Reports: No Limitations





- History of Present Illness


INITIAL COMMENTS - FREE TEXT/NARRATIVE: 





55-year-old male sent over from [facility] with a low-grade fever. 

He recently had surgery in [place] for splenic hematoma, he has also had 

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  He has a 

MARIA ELENA type drain from the left lower abdomen, he is unsure what kind of surgery he 

had.  He feels better than he has in a long time, he still has some abdominal 

pain which is chronic but it is no worse than usual.  He does not feel short of 

breath, no cough, has no chills.  However today on a routine check he was found 

to have a low-grade fever of 101.2, recheck was 100.2.  They called the clinic 

to discuss this with Dr. Montelongo, he informed them to send him to the 

emergency room because he can have a "better work-up there".  Patient arrives 

afebrile, completely asymptomatic other than his chronic mild abdominal discomfo

rt.  He is already on Augmentin and tolerating the medication well.


Onset: Unknown/Unsure


Associated Symptoms: Reports: Fever/Chills (Low-grade fever according to 

nursing), Other (Chronic waxing and waning abdominal pain).  Denies: Malaise, 

Nausea/Vomiting, Shortness of Breath





- Related Data


                                    Allergies











Allergy/AdvReac Type Severity Reaction Status Date / Time


 


No Known Allergies Allergy   Verified 02/18/21 11:59











Home Meds: 


                                    Home Meds





Omeprazole 1 tab PO DAILY PRN 06/21/20 [History]


predniSONE [Prednisone] 1 tab PO DAILY #30 06/23/20 [Rx]


Folic Acid 1 tab PO DAILY 10/27/20 [History]


Magnesium Chloride [Mag-64] 1 cap PO DAILY 10/27/20 [History]


Multivit,Calc,Mins/Iron/Folic [Thera-M] 1 cap PO DAILY 10/27/20 [History]


Potassium Chloride [Klor-Con] 20 meq PO DAILY 10/27/20 [History]


Thiamine HCl [Vitamin B-1] 250 mg PO DAILY 10/27/20 [History]


Acetaminophen 500 mg PO Q4H 02/18/21 [History]


Amoxicillin/Clavulanate K [Augmentin 875-125 MG] 1 tab PO BID 02/18/21 [History]


Calcium Carb &Gluconate/Vit D2 [Parva-Сергей 250 Tablet] 1 tab PO BID 02/18/21 

[History]


Ferrous Sulfate 324 mg PO DAILY 02/18/21 [History]


Lipase/Protease/Amylase [Creon Dr 36,000 Units Capsule] 1 tab PO DAILY 02/18/21 

[History]


Loperamide [Imodium] 2 mg PO DAILY 02/18/21 [History]


oxyCODONE 5 mg PO Q6H PRN 02/18/21 [History]











Past Medical History


HEENT History: Reports: None


Cardiovascular History: Reports: None


Respiratory History: Reports: None


Gastrointestinal History: Reports: Chronic Diarrhea, GERD, Inflammatory Bowel 

Disease, Pancreatitis


Other Gastrointestinal History: chrons


Genitourinary History: Reports: None


Musculoskeletal History: Reports: Other (See Below)


Other Musculoskeletal History: crush injury r knee  3 to 4 years ago.


Psychiatric History: Reports: None


Hematologic History: Reports: None


Immunologic History: Reports: None


Oncologic (Cancer) History: Reports: None





- Infectious Disease History


Infectious Disease History: Reports: Chicken Pox





- Past Surgical History


HEENT Surgical History: Reports: None


GI Surgical History: Reports: Cholecystectomy, Colon, Colonoscopy, Hernia 

Repair/Other, Small Bowel, Other (See Below)


Other GI Surgeries/Procedures: MADHURI.  peritoneal abscess.  sepsis





Social & Family History





- Family History


Family Medical History: No Pertinent Family History


Oncologic: Reports: Other (See Below)





- Tobacco Use


Tobacco Use Status *Q: Current Some Day Tobacco User





- Caffeine Use


Caffeine Use: Reports: Coffee





ED ROS GENERAL





- Review of Systems


Review Of Systems: See Below


Constitutional: Reports: Fever


HEENT: Reports: No Symptoms


Respiratory: Denies: Shortness of Breath


Cardiovascular: Denies: Chest Pain


GI/Abdominal: Reports: Abdominal Pain.  Denies: Nausea, Vomiting


Neurological: Denies: Headache





ED EXAM, GENERAL





- Physical Exam


Exam: See Below


Exam Limited By: No Limitations


General Appearance: Alert, No Apparent Distress


Eye Exam: Bilateral Eye: Normal Inspection (No jaundice)


Respiratory/Chest: No Respiratory Distress, Lungs Clear


Cardiovascular: Regular Rate, Rhythm


GI/Abdominal: Tender (Does react with tenderness diffusely to palpation of the 

abdomen but no distention or focal tenderness), Other (Small drainage tube is 

exiting the left lower abdomen with red serous appearing drainage, it is not 

purulent)


Neurological: Alert, Oriented


Psychiatric: Normal Affect, Normal Mood


Skin Exam: Warm, Dry





Course





- Vital Signs


Last Recorded V/S: 


                                Last Vital Signs











Temp  98.4 F   02/18/21 13:30


 


Pulse  105 H  02/18/21 11:59


 


Resp  16   02/18/21 11:59


 


BP  110/60   02/18/21 11:59


 


Pulse Ox  98   02/18/21 11:59














- Orders/Labs/Meds


Labs: 


                                Laboratory Tests











  02/18/21 02/18/21 Range/Units





  12:20 12:20 


 


WBC  10.1   (4.5-11.0)  K/uL


 


RBC  3.59 L   (4.30-5.90)  M/uL


 


Hgb  9.5 L D   (12.0-15.0)  g/dL


 


Hct  30.3 L   (40.0-54.0)  %


 


MCV  84   (80-98)  fL


 


MCH  27   (27-31)  pg


 


MCHC  31 L   (32-36)  %


 


Plt Count  689 H   (150-400)  K/uL


 


Neut % (Auto)  69 H   (36-66)  %


 


Lymph % (Auto)  18 L   (24-44)  %


 


Mono % (Auto)  11 H   (2-6)  %


 


Eos % (Auto)  0 L   (2-4)  %


 


Baso % (Auto)  1   (0-1)  %


 


Sodium   135 L  (140-148)  mmol/L


 


Potassium   3.7  (3.6-5.2)  mmol/L


 


Chloride   101  (100-108)  mmol/L


 


Carbon Dioxide   28  (21-32)  mmol/L


 


Anion Gap   9.7  (5.0-14.0)  mmol/L


 


BUN   6 L  (7-18)  mg/dL


 


Creatinine   0.6 L  (0.8-1.3)  mg/dL


 


Est Cr Clr Drug Dosing   95.86  mL/min


 


Estimated GFR (MDRD)   > 60  (>60)  


 


Glucose   112 H  ()  mg/dL


 


Calcium   8.2 L  (8.5-10.1)  mg/dL


 


C-Reactive Protein   1.59 H  (0.0-0.3)  mg/dL














- Re-Assessments/Exams


Free Text/Narrative Re-Assessment/Exam: 





02/18/21 12:35


Patient is more concerned about what is on TV than his symptoms because he feels

 so good.  Temperature was taken 3 times in the first 10 minutes he was in the 

emergency room, all normal.  A CBC, CRP and BMP were obtained and if he is still

 afebrile after the labs are back, he will be sent back to the nursing home and 

continued on his Augmentin.


02/18/21 12:50


Patient continued to be comfortable and afebrile, white count is normal and CRP 

is barely elevated at 1.3.  No medication changes needed or further work-up at 

this time.





Departure





- Departure


Time of Disposition: 13:47


Disposition: DC/Tfer to [place] [id number]


Clinical Impression: 


 Fever that comes and goes





Abdominal pain


Qualifiers:


 Abdominal location: lower abdomen, unspecified Qualified Code(s): R10.30 - 

Lower abdominal pain, unspecified








- Discharge Information


Instructions:  Abdominal Pain, Adult, Easy-to-Read


Referrals: 


PCP,None [Primary Care Provider] - 


Forms:  ED Department Discharge


Care Plan Goals: 


Continue current medications, recheck if fever is recurring especially if he 

develops symptoms.  If asymptomatic just treat fever as needed.





Sepsis Event Note (ED)





- Evaluation


Sepsis Screening Result: Possible Sepsis Risk





- Focused Exam


Vital Signs: 


                                   Vital Signs











  Temp Pulse Resp BP Pulse Ox


 


 02/18/21 13:30  98.4 F    


 


 02/18/21 13:00  98.6 F    


 


 02/18/21 11:59  98.4 F  105 H  16  110/60  98

## 2021-02-21 ENCOUNTER — HOSPITAL ENCOUNTER (EMERGENCY)
Dept: HOSPITAL 11 - JP.ED | Age: 56
Discharge: NURSING, REHAB OR LONG-TERM CARE | End: 2021-02-21
Payer: MEDICAID

## 2021-02-21 DIAGNOSIS — K21.9: ICD-10-CM

## 2021-02-21 DIAGNOSIS — K56.609: Primary | ICD-10-CM

## 2021-02-21 DIAGNOSIS — R18.8: ICD-10-CM

## 2021-02-21 DIAGNOSIS — Z79.899: ICD-10-CM

## 2021-02-21 PROCEDURE — 74177 CT ABD & PELVIS W/CONTRAST: CPT

## 2021-02-21 PROCEDURE — 99285 EMERGENCY DEPT VISIT HI MDM: CPT

## 2021-02-21 PROCEDURE — 81001 URINALYSIS AUTO W/SCOPE: CPT

## 2021-02-21 PROCEDURE — 80053 COMPREHEN METABOLIC PANEL: CPT

## 2021-02-21 PROCEDURE — 87040 BLOOD CULTURE FOR BACTERIA: CPT

## 2021-02-21 PROCEDURE — 86140 C-REACTIVE PROTEIN: CPT

## 2021-02-21 PROCEDURE — 96365 THER/PROPH/DIAG IV INF INIT: CPT

## 2021-02-21 PROCEDURE — 96376 TX/PRO/DX INJ SAME DRUG ADON: CPT

## 2021-02-21 PROCEDURE — 83690 ASSAY OF LIPASE: CPT

## 2021-02-21 PROCEDURE — 96368 THER/DIAG CONCURRENT INF: CPT

## 2021-02-21 PROCEDURE — 36415 COLL VENOUS BLD VENIPUNCTURE: CPT

## 2021-02-21 PROCEDURE — 96375 TX/PRO/DX INJ NEW DRUG ADDON: CPT

## 2021-02-21 PROCEDURE — 83550 IRON BINDING TEST: CPT

## 2021-02-21 PROCEDURE — 85025 COMPLETE CBC W/AUTO DIFF WBC: CPT

## 2021-02-21 PROCEDURE — 83605 ASSAY OF LACTIC ACID: CPT

## 2021-02-21 PROCEDURE — 82150 ASSAY OF AMYLASE: CPT

## 2021-02-21 PROCEDURE — 85610 PROTHROMBIN TIME: CPT

## 2021-02-21 NOTE — CRLCT
INDICATION:



Increased abdominal pain, recent surgery.



TECHNIQUE:



Axial images were obtained from the diaphragm to the pubic symphysis.



Reformats were obtained in the coronal and sagittal plane.



IV Contrast: 69 cc Isovue-300



Oral Contrast: None



COMPARISON:



Abdomen and pelvis CT 01/21/2021 



FINDINGS:



Lower chest: Trace left pleural effusion. Basilar discoid atelectasis. 



Liver: Subcentimeter cysts within the left lobe of the liver. 



Gallbladder and bile ducts: Status post cholecystectomy. 



Spleen: The spleen is diminutive and irregular consistent with recent 

laceration and devascularization of most of the spleen. Rim enhancing 

subcapsular collection is re-demonstrated consistent with evolving 

hematoma. This measures up to 15.2 x 9.7 centimeters. A small amount of air 

is present in the collection with a left lateral pigtail catheter at the 

inferior aspect of the collection.



Pancreas: Extensive calcification involving the length of the pancreas with 

coils near the pancreatic tail. Air noted within the pancreas, presumably 

due to alteration of the pancreatic duct. Pancreas itself is heterogeneous 

with some hazy density especially at the level of the pancreatic head. 



Adrenal glands: Unremarkable. No nodules. 



Kidneys: Left kidney is normal in contour without hydronephrosis. 

Heterogeneous enhancement of the right kidney. 



Vasculature: Atherosclerosis without abdominal aortic aneurysm. Moderate 

stenosis superior mesenteric artery. Moderate to severe stenosis near the 

origin of the right renal artery.



GI tract: Air along the right abdominal wall consistent with recent 

surgery. Trace ascites. The stomach is decompressed. Previous duodenal 

stent is no longer seen. There are mildly dilated loops of small bowel with 

decompressed distal small bowel. This transitions within the pelvis. No 

definite mass identified at the site of transition. Some small bowel wall 

thickening questioned at this level. Additionally, note is made of an 

amorphous appearance of the colonic wall involving the transverse colon.



Pelvis: Unremarkable. 



Bones: Old L4 compression fracture. 



IMPRESSION:



1. Mildly dilated loops of small bowel with transition in the mid pelvis. 

Appearance is suggestive of a low-grade small bowel obstruction. Underlying 

etiology appears to be some small bowel wall thickening, possibly an 

enteritis at this level.



2. Somewhat amorphous appearance of the distal transverse colon with some 

bowel wall thickening and haziness of the adjacent fat. Question mild 

colitis.



3. Minimal residual spleen with large subcapsular rim enhancing collection 

consistent with evolving hematoma with drain at the inferior aspect of the 

collection.



4. Multiple pancreatic calcifications consistent with chronic pancreatitis. 

Some haziness at the pancreatic head can represent acute on chronic 

pancreatitis. Correlation with lipase is suggested.



5. Trace ascites and trace left pleural effusion.



6. Heterogeneous enhancement of the right kidney, new compared to the study 

of 1 month prior. Appearance is suspicious for small areas of renal 

infarction.



Please note that all CT scans at this facility use dose modulation, 

iterative reconstruction, and/or weight-based dosing when appropriate to 

reduce radiation dose to as low as reasonably achievable.



Dictated by Mohit Booker MD @ Feb 21 2021  5:36AM



Signed by Dr. Mohit Booker @ Feb 21 2021  6:02AM

## 2021-02-21 NOTE — EDM.PDOC
ED HPI GENERAL MEDICAL PROBLEM





- General


Chief Complaint: Abdominal Pain


Stated Complaint: ABD PAIN


Time Seen by Provider: 02/21/21 03:14


Source of Information: Reports: Patient


History Limitations: Reports: No Limitations





- History of Present Illness


INITIAL COMMENTS - FREE TEXT/NARRATIVE: 





pt arrived with increased pain in the abdoman.  He had a ruptured spleen and 

then developed a peroneal abcess and has a MARIA ELENA tube for drainage.  he is putting 

out about 50 cc when it is emptyied. 


Onset: Gradual


Duration: Day(s):


Location: Reports: Abdomen, Generalized


Associated Symptoms: Reports: Malaise


Treatments PTA: Reports: See EMS Report


  ** Left Abdominal


Pain Score (Numeric/FACES): 10





- Related Data


                                    Allergies











Allergy/AdvReac Type Severity Reaction Status Date / Time


 


No Known Allergies Allergy   Verified 02/18/21 11:59











Home Meds: 


                                    Home Meds





Omeprazole 1 tab PO DAILY PRN 06/21/20 [History]


predniSONE [Prednisone] 1 tab PO DAILY #30 06/23/20 [Rx]


Folic Acid 1 tab PO DAILY 10/27/20 [History]


Magnesium Chloride [Mag-64] 1 cap PO DAILY 10/27/20 [History]


Multivit,Calc,Mins/Iron/Folic [Thera-M] 1 cap PO DAILY 10/27/20 [History]


Potassium Chloride [Klor-Con] 20 meq PO DAILY 10/27/20 [History]


Thiamine HCl [Vitamin B-1] 250 mg PO DAILY 10/27/20 [History]


Acetaminophen 500 mg PO Q4H 02/18/21 [History]


Amoxicillin/Clavulanate K [Augmentin 875-125 MG] 1 tab PO BID 02/18/21 [History]


Calcium Carb &Gluconate/Vit D2 [Parva-Сергей 250 Tablet] 1 tab PO BID 02/18/21 

[History]


Ferrous Sulfate 324 mg PO DAILY 02/18/21 [History]


Lipase/Protease/Amylase [Creon Dr 36,000 Units Capsule] 1 tab PO DAILY 02/18/21 

[History]


Loperamide [Imodium] 2 mg PO DAILY 02/18/21 [History]


oxyCODONE 5 mg PO Q6H PRN 02/18/21 [History]


Warfarin [Coumadin] 2.5 mg PO WEEKLY 02/21/21 [History]


Warfarin [Coumadin] 3.75 mg PO ASDIRECTED 02/21/21 [History]











Past Medical History


HEENT History: Reports: None


Cardiovascular History: Reports: None


Respiratory History: Reports: None


Gastrointestinal History: Reports: Chronic Diarrhea, GERD, Inflammatory Bowel 

Disease, Pancreatitis


Other Gastrointestinal History: chrons


Genitourinary History: Reports: None


Musculoskeletal History: Reports: Fracture, Other (See Below)


Other Musculoskeletal History: crush injury r knee  3 to 4 years ago.


Psychiatric History: Reports: None


Hematologic History: Reports: None


Immunologic History: Reports: None


Oncologic (Cancer) History: Reports: None





- Infectious Disease History


Infectious Disease History: Reports: Chicken Pox





- Past Surgical History


HEENT Surgical History: Reports: None


GI Surgical History: Reports: Cholecystectomy, Colon, Colonoscopy, Hernia 

Repair/Other, Small Bowel, Other (See Below)


Other GI Surgeries/Procedures: MADHURI.  peritoneal abscess.  sepsis





Social & Family History





- Family History


Family Medical History: No Pertinent Family History


Oncologic: Reports: Other (See Below)





- Caffeine Use


Caffeine Use: Reports: Coffee





ED ROS GENERAL





- Review of Systems


Review Of Systems: See Below


Constitutional: Reports: Weakness, Decreased Appetite


HEENT: Reports: No Symptoms


Respiratory: Reports: No Symptoms


Cardiovascular: Reports: No Symptoms


Endocrine: Reports: No Symptoms


GI/Abdominal: Reports: Abdominal Pain, Decreased Appetite, Other (pt has alot of

 tenderness over the rt lower abdoman.)


: Reports: No Symptoms


Musculoskeletal: Reports: No Symptoms


Skin: Reports: No Symptoms


Neurological: Reports: No Symptoms


Psychiatric: Reports: No Symptoms





ED EXAM, GI/ABD





- Physical Exam


Exam: See Below


Text/Narrative:: 





pt arrived with increased pain in the rt upper abdoman. He has a jtube in and 

there is about 50 cc of drainage daily. 


Exam Limited By: No Limitations


General Appearance: Alert, Anxious, Moderate Distress


Ears: Normal TMs


Nose: Normal Inspection


Throat/Mouth: Normal Inspection


Head: Atraumatic


Neck: Normal Inspection


Respiratory/Chest: No Respiratory Distress


Cardiovascular: Regular Rate, Rhythm


GI/Abdominal Exam: Guarding, Tender


 (Male) Exam: Deferred


Rectal (Males) Exam: Deferred


Back Exam: Normal Inspection


Extremities: Normal Inspection


Neurological: Alert, Oriented, Normal Cognition





Course





- Vital Signs


Last Recorded V/S: 


                                Last Vital Signs











Temp  37.1 C   02/21/21 02:58


 


Pulse  114 H  02/21/21 05:51


 


Resp  15   02/21/21 05:51


 


BP  142/79 H  02/21/21 05:51


 


Pulse Ox  98   02/21/21 05:51














- Orders/Labs/Meds


Orders: 


                               Active Orders 24 hr











 Category Date Time Status


 


 CULTURE BLOOD [BC] Urgent Lab  02/21/21 03:00 Received


 


 CULTURE BLOOD [BC] Urgent Lab  02/21/21 03:30 Received


 


 Blood Culture x2 Reflex Set [OM.PC] Urgent Oth  02/21/21 03:24 Ordered











Labs: 


                                Laboratory Tests











  02/21/21 02/21/21 02/21/21 Range/Units





  03:13 03:13 03:13 


 


WBC  8.7    (4.5-11.0)  K/uL


 


RBC  3.68 L    (4.30-5.90)  M/uL


 


Hgb  9.8 L    (12.0-15.0)  g/dL


 


Hct  30.5 L    (40.0-54.0)  %


 


MCV  83    (80-98)  fL


 


MCH  27    (27-31)  pg


 


MCHC  32    (32-36)  %


 


Plt Count  758 H    (150-400)  K/uL


 


Neut % (Auto)  71 H    (36-66)  %


 


Lymph % (Auto)  18 L    (24-44)  %


 


Mono % (Auto)  10 H    (2-6)  %


 


Eos % (Auto)  1 L    (2-4)  %


 


Baso % (Auto)  1    (0-1)  %


 


PT     (9.5-12.0)  sec


 


INR     (0.80-1.20)  


 


Sodium   135 L   (140-148)  mmol/L


 


Potassium   3.3 L   (3.6-5.2)  mmol/L


 


Chloride   99 L   (100-108)  mmol/L


 


Carbon Dioxide   26   (21-32)  mmol/L


 


Anion Gap   13.3   (5.0-14.0)  mmol/L


 


BUN   3 L   (7-18)  mg/dL


 


Creatinine   0.6 L   (0.8-1.3)  mg/dL


 


Est Cr Clr Drug Dosing   87.15   mL/min


 


Estimated GFR (MDRD)   > 60   (>60)  


 


Glucose   100   ()  mg/dL


 


Lactic Acid    0.7  (0.4-2.0)  mmol/L


 


Calcium   8.3 L   (8.5-10.1)  mg/dL


 


Iron     ()  ug/dL


 


TIBC     (250-450)  ug/dl


 


% Saturation     (20-55)  %


 


Total Bilirubin   0.2   (0.2-1.0)  mg/dL


 


AST   12 L   (15-37)  U/L


 


ALT   8 L   (12-78)  U/L


 


Alkaline Phosphatase   158 H   ()  U/L


 


C-Reactive Protein     (0.0-0.3)  mg/dL


 


Total Protein   5.6 L   (6.4-8.2)  g/dL


 


Albumin   1.9 L   (3.4-5.0)  g/dL


 


Globulin   3.7 H   (2.3-3.5)  g/dL


 


Albumin/Globulin Ratio   0.5 L   (1.2-2.2)  


 


Amylase     ()  U/L


 


Lipase     ()  U/L


 


Urine Color     (YELLOW)  


 


Urine Appearance     (CLEAR)  


 


Urine pH     (5.0-8.0)  


 


Ur Specific Gravity     (1.008-1.030)  


 


Urine Protein     (NEGATIVE)  mg/dL


 


Urine Glucose (UA)     (NEGATIVE)  mg/dL


 


Urine Ketones     (NEGATIVE)  mg/dL


 


Urine Occult Blood     (NEGATIVE)  


 


Urine Nitrite     (NEGATIVE)  


 


Urine Bilirubin     (NEGATIVE)  


 


Urine Urobilinogen     (0.2-1.0)  EU/dL


 


Ur Leukocyte Esterase     (NEGATIVE)  


 


Urine RBC     (0-5)  


 


Urine WBC     (0-5)  


 


Ur Epithelial Cells     


 


Amorphous Sediment     


 


Urine Bacteria     


 


Urine Mucus     














  02/21/21 02/21/21 02/21/21 Range/Units





  03:13 04:02 04:25 


 


WBC     (4.5-11.0)  K/uL


 


RBC     (4.30-5.90)  M/uL


 


Hgb     (12.0-15.0)  g/dL


 


Hct     (40.0-54.0)  %


 


MCV     (80-98)  fL


 


MCH     (27-31)  pg


 


MCHC     (32-36)  %


 


Plt Count     (150-400)  K/uL


 


Neut % (Auto)     (36-66)  %


 


Lymph % (Auto)     (24-44)  %


 


Mono % (Auto)     (2-6)  %


 


Eos % (Auto)     (2-4)  %


 


Baso % (Auto)     (0-1)  %


 


PT     (9.5-12.0)  sec


 


INR     (0.80-1.20)  


 


Sodium     (140-148)  mmol/L


 


Potassium     (3.6-5.2)  mmol/L


 


Chloride     (100-108)  mmol/L


 


Carbon Dioxide     (21-32)  mmol/L


 


Anion Gap     (5.0-14.0)  mmol/L


 


BUN     (7-18)  mg/dL


 


Creatinine     (0.8-1.3)  mg/dL


 


Est Cr Clr Drug Dosing     mL/min


 


Estimated GFR (MDRD)     (>60)  


 


Glucose     ()  mg/dL


 


Lactic Acid     (0.4-2.0)  mmol/L


 


Calcium     (8.5-10.1)  mg/dL


 


Iron   10 L   ()  ug/dL


 


TIBC   121 L   (250-450)  ug/dl


 


% Saturation   8 L   (20-55)  %


 


Total Bilirubin     (0.2-1.0)  mg/dL


 


AST     (15-37)  U/L


 


ALT     (12-78)  U/L


 


Alkaline Phosphatase     ()  U/L


 


C-Reactive Protein    2.16 H  (0.0-0.3)  mg/dL


 


Total Protein     (6.4-8.2)  g/dL


 


Albumin     (3.4-5.0)  g/dL


 


Globulin     (2.3-3.5)  g/dL


 


Albumin/Globulin Ratio     (1.2-2.2)  


 


Amylase  56  D    ()  U/L


 


Lipase  87    ()  U/L


 


Urine Color     (YELLOW)  


 


Urine Appearance     (CLEAR)  


 


Urine pH     (5.0-8.0)  


 


Ur Specific Gravity     (1.008-1.030)  


 


Urine Protein     (NEGATIVE)  mg/dL


 


Urine Glucose (UA)     (NEGATIVE)  mg/dL


 


Urine Ketones     (NEGATIVE)  mg/dL


 


Urine Occult Blood     (NEGATIVE)  


 


Urine Nitrite     (NEGATIVE)  


 


Urine Bilirubin     (NEGATIVE)  


 


Urine Urobilinogen     (0.2-1.0)  EU/dL


 


Ur Leukocyte Esterase     (NEGATIVE)  


 


Urine RBC     (0-5)  


 


Urine WBC     (0-5)  


 


Ur Epithelial Cells     


 


Amorphous Sediment     


 


Urine Bacteria     


 


Urine Mucus     














  02/21/21 02/21/21 Range/Units





  04:25 05:56 


 


WBC    (4.5-11.0)  K/uL


 


RBC    (4.30-5.90)  M/uL


 


Hgb    (12.0-15.0)  g/dL


 


Hct    (40.0-54.0)  %


 


MCV    (80-98)  fL


 


MCH    (27-31)  pg


 


MCHC    (32-36)  %


 


Plt Count    (150-400)  K/uL


 


Neut % (Auto)    (36-66)  %


 


Lymph % (Auto)    (24-44)  %


 


Mono % (Auto)    (2-6)  %


 


Eos % (Auto)    (2-4)  %


 


Baso % (Auto)    (0-1)  %


 


PT  33.6 H   (9.5-12.0)  sec


 


INR  3.15 H   (0.80-1.20)  


 


Sodium    (140-148)  mmol/L


 


Potassium    (3.6-5.2)  mmol/L


 


Chloride    (100-108)  mmol/L


 


Carbon Dioxide    (21-32)  mmol/L


 


Anion Gap    (5.0-14.0)  mmol/L


 


BUN    (7-18)  mg/dL


 


Creatinine    (0.8-1.3)  mg/dL


 


Est Cr Clr Drug Dosing    mL/min


 


Estimated GFR (MDRD)    (>60)  


 


Glucose    ()  mg/dL


 


Lactic Acid    (0.4-2.0)  mmol/L


 


Calcium    (8.5-10.1)  mg/dL


 


Iron    ()  ug/dL


 


TIBC    (250-450)  ug/dl


 


% Saturation    (20-55)  %


 


Total Bilirubin    (0.2-1.0)  mg/dL


 


AST    (15-37)  U/L


 


ALT    (12-78)  U/L


 


Alkaline Phosphatase    ()  U/L


 


C-Reactive Protein    (0.0-0.3)  mg/dL


 


Total Protein    (6.4-8.2)  g/dL


 


Albumin    (3.4-5.0)  g/dL


 


Globulin    (2.3-3.5)  g/dL


 


Albumin/Globulin Ratio    (1.2-2.2)  


 


Amylase    ()  U/L


 


Lipase    ()  U/L


 


Urine Color   Yellow  (YELLOW)  


 


Urine Appearance   Clear  (CLEAR)  


 


Urine pH   6.0  (5.0-8.0)  


 


Ur Specific Gravity   1.015  (1.008-1.030)  


 


Urine Protein   Negative  (NEGATIVE)  mg/dL


 


Urine Glucose (UA)   Negative  (NEGATIVE)  mg/dL


 


Urine Ketones   Negative  (NEGATIVE)  mg/dL


 


Urine Occult Blood   Negative  (NEGATIVE)  


 


Urine Nitrite   Negative  (NEGATIVE)  


 


Urine Bilirubin   Negative  (NEGATIVE)  


 


Urine Urobilinogen   0.2  (0.2-1.0)  EU/dL


 


Ur Leukocyte Esterase   Negative  (NEGATIVE)  


 


Urine RBC   0-5  (0-5)  


 


Urine WBC   0-5  (0-5)  


 


Ur Epithelial Cells   Rare  


 


Amorphous Sediment   Not seen  


 


Urine Bacteria   Few  


 


Urine Mucus   Few  











Meds: 


Medications














Discontinued Medications














Generic Name Dose Route Start Last Admin





  Trade Name Tevinq  PRN Reason Stop Dose Admin


 


Hydromorphone HCl  1 mg  02/21/21 03:11  02/21/21 03:23





  Dilaudid  IVPUSH  02/21/21 03:12  1 mg





  ONETIME ONE   Administration


 


Hydromorphone HCl  0.5 mg  02/21/21 05:05  02/21/21 05:55





  Dilaudid  IVPUSH  02/21/21 05:06  0.5 mg





  ONETIME ONE   Administration


 


Hydromorphone HCl  1 mg  02/21/21 07:09  02/21/21 07:14





  Dilaudid  IVPUSH  02/21/21 07:10  1 mg





  ONETIME ONE   Administration


 


Hydromorphone HCl  0.5 mg  02/21/21 08:11  02/21/21 08:17





  Dilaudid  IVPUSH  02/21/21 08:12  0.5 mg





  ONETIME ONE   Administration


 


Sodium Chloride  1,000 mls @ 300 mls/hr  02/21/21 03:15  02/21/21 07:18





  Normal Saline  IV   300 mls/hr





  ASDIRECTED DAPHNE   Administration


 


Sodium Chloride  70 mls @ 3 mls/sec  02/21/21 04:30  02/21/21 04:58





  Normal Saline  IV   2 mls/sec





  ASDIRECTED DAPHNE   Administration


 


Ceftriaxone Sodium 2 gm/  50 mls @ 100 mls/hr  02/21/21 07:04  02/21/21 07:33





  Sodium Chloride  IV  02/21/21 07:33  100 mls/hr





  ONETIME ONE   Administration


 


Metronidazole 500 mg/ Premix  100 mls @ 100 mls/hr  02/21/21 07:05  02/21/21 

07:17





  IV  02/21/21 08:04  100 mls/hr





  ONETIME ONE   Administration


 


Iopamidol  69 ml  02/21/21 04:16  02/21/21 04:58





  Isovue-300 (61%)  IV  02/22/21 04:17  69 ml





  .AS DIRECTED PRN   Administration





  RADIOLOGY EXAM  


 


Sodium Chloride  10 ml  02/21/21 04:16  02/21/21 04:58





  Saline Flush  FLUSH  02/21/21 04:17  10 ml





  ONETIME ONE   Administration














- Re-Assessments/Exams


Free Text/Narrative Re-Assessment/Exam: 





02/21/21 06:42


pt has a inr greater than 3. He has increased pain in the rt upper abdoman. He  

had a cat scan which showed a 15x9 cm fluid collection over the spleen capsule 

which may be  causing the pain. He has some signs of a possible small bowel 

obstruction, low grade. 





Departure





- Departure


Time of Disposition: 08:55


Disposition: DC/[facility]fer to [facility]


Condition: Fair


Clinical Impression: 


 Fluid collection at surgical site, Small bowel obstruction








- Discharge Information


Referrals: 


Faizan Montelongo MD [Primary Care Provider] - 


Forms:  ED Department Discharge


Care Plan Goals: 


transfer to [facility]





Sepsis Event Note (ED)





- Evaluation


Sepsis Screening Result: Possible Sepsis Risk





- My Orders


Last 24 Hours: 


My Active Orders





02/21/21 03:00


CULTURE BLOOD [BC] Urgent 





02/21/21 03:24


Blood Culture x2 Reflex Set [OM.PC] Urgent 





02/21/21 03:30


CULTURE BLOOD [BC] Urgent 














- Assessment/Plan


Last 24 Hours: 


My Active Orders





02/21/21 03:00


CULTURE BLOOD [BC] Urgent 





02/21/21 03:24


Blood Culture x2 Reflex Set [OM.PC] Urgent 





02/21/21 03:30


CULTURE BLOOD [BC] Urgent